# Patient Record
Sex: MALE | Race: WHITE | NOT HISPANIC OR LATINO | Employment: FULL TIME | URBAN - METROPOLITAN AREA
[De-identification: names, ages, dates, MRNs, and addresses within clinical notes are randomized per-mention and may not be internally consistent; named-entity substitution may affect disease eponyms.]

---

## 2023-08-04 ENCOUNTER — OFFICE VISIT (OUTPATIENT)
Dept: PRIMARY CARE | Facility: CLINIC | Age: 43
End: 2023-08-04
Payer: COMMERCIAL

## 2023-08-04 ENCOUNTER — LAB (OUTPATIENT)
Dept: LAB | Facility: LAB | Age: 43
End: 2023-08-04
Payer: COMMERCIAL

## 2023-08-04 VITALS
OXYGEN SATURATION: 98 % | WEIGHT: 212 LBS | BODY MASS INDEX: 30.35 KG/M2 | RESPIRATION RATE: 18 BRPM | SYSTOLIC BLOOD PRESSURE: 122 MMHG | HEIGHT: 70 IN | TEMPERATURE: 98 F | DIASTOLIC BLOOD PRESSURE: 76 MMHG | HEART RATE: 64 BPM

## 2023-08-04 DIAGNOSIS — Z00.00 HEALTH CARE MAINTENANCE: Primary | ICD-10-CM

## 2023-08-04 DIAGNOSIS — Z00.00 HEALTH CARE MAINTENANCE: ICD-10-CM

## 2023-08-04 PROBLEM — S83.411A TEAR OF MCL (MEDIAL COLLATERAL LIGAMENT) OF KNEE, RIGHT, INITIAL ENCOUNTER: Status: ACTIVE | Noted: 2023-08-04

## 2023-08-04 LAB
ALANINE AMINOTRANSFERASE (SGPT) (U/L) IN SER/PLAS: 15 U/L (ref 10–52)
ALBUMIN (G/DL) IN SER/PLAS: 5 G/DL (ref 3.4–5)
ALKALINE PHOSPHATASE (U/L) IN SER/PLAS: 74 U/L (ref 33–120)
ANION GAP IN SER/PLAS: 14 MMOL/L (ref 10–20)
ASPARTATE AMINOTRANSFERASE (SGOT) (U/L) IN SER/PLAS: 21 U/L (ref 9–39)
BASOPHILS (10*3/UL) IN BLOOD BY AUTOMATED COUNT: 0.03 X10E9/L (ref 0–0.1)
BASOPHILS/100 LEUKOCYTES IN BLOOD BY AUTOMATED COUNT: 0.6 % (ref 0–2)
BILIRUBIN TOTAL (MG/DL) IN SER/PLAS: 0.8 MG/DL (ref 0–1.2)
CALCIUM (MG/DL) IN SER/PLAS: 10 MG/DL (ref 8.6–10.3)
CARBON DIOXIDE, TOTAL (MMOL/L) IN SER/PLAS: 28 MMOL/L (ref 21–32)
CHLORIDE (MMOL/L) IN SER/PLAS: 101 MMOL/L (ref 98–107)
CHOLESTEROL (MG/DL) IN SER/PLAS: 191 MG/DL (ref 0–199)
CHOLESTEROL IN HDL (MG/DL) IN SER/PLAS: 52.9 MG/DL
CHOLESTEROL/HDL RATIO: 3.6
CREATININE (MG/DL) IN SER/PLAS: 1.05 MG/DL (ref 0.5–1.3)
EOSINOPHILS (10*3/UL) IN BLOOD BY AUTOMATED COUNT: 0.19 X10E9/L (ref 0–0.7)
EOSINOPHILS/100 LEUKOCYTES IN BLOOD BY AUTOMATED COUNT: 4 % (ref 0–6)
ERYTHROCYTE DISTRIBUTION WIDTH (RATIO) BY AUTOMATED COUNT: 12.5 % (ref 11.5–14.5)
ERYTHROCYTE MEAN CORPUSCULAR HEMOGLOBIN CONCENTRATION (G/DL) BY AUTOMATED: 34.1 G/DL (ref 32–36)
ERYTHROCYTE MEAN CORPUSCULAR VOLUME (FL) BY AUTOMATED COUNT: 90 FL (ref 80–100)
ERYTHROCYTES (10*6/UL) IN BLOOD BY AUTOMATED COUNT: 4.65 X10E12/L (ref 4.5–5.9)
GFR MALE: >90 ML/MIN/1.73M2
GLUCOSE (MG/DL) IN SER/PLAS: 82 MG/DL (ref 74–99)
HEMATOCRIT (%) IN BLOOD BY AUTOMATED COUNT: 41.9 % (ref 41–52)
HEMOGLOBIN (G/DL) IN BLOOD: 14.3 G/DL (ref 13.5–17.5)
IMMATURE GRANULOCYTES/100 LEUKOCYTES IN BLOOD BY AUTOMATED COUNT: 0.2 % (ref 0–0.9)
LDL: 121 MG/DL (ref 0–99)
LEUKOCYTES (10*3/UL) IN BLOOD BY AUTOMATED COUNT: 4.7 X10E9/L (ref 4.4–11.3)
LYMPHOCYTES (10*3/UL) IN BLOOD BY AUTOMATED COUNT: 2.23 X10E9/L (ref 1.2–4.8)
LYMPHOCYTES/100 LEUKOCYTES IN BLOOD BY AUTOMATED COUNT: 47.2 % (ref 13–44)
MONOCYTES (10*3/UL) IN BLOOD BY AUTOMATED COUNT: 0.34 X10E9/L (ref 0.1–1)
MONOCYTES/100 LEUKOCYTES IN BLOOD BY AUTOMATED COUNT: 7.2 % (ref 2–10)
NEUTROPHILS (10*3/UL) IN BLOOD BY AUTOMATED COUNT: 1.92 X10E9/L (ref 1.2–7.7)
NEUTROPHILS/100 LEUKOCYTES IN BLOOD BY AUTOMATED COUNT: 40.8 % (ref 40–80)
PLATELETS (10*3/UL) IN BLOOD AUTOMATED COUNT: 278 X10E9/L (ref 150–450)
POC APPEARANCE, URINE: CLEAR
POC BILIRUBIN, URINE: NEGATIVE
POC BLOOD, URINE: NEGATIVE
POC COLOR, URINE: YELLOW
POC GLUCOSE, URINE: NEGATIVE MG/DL
POC KETONES, URINE: NEGATIVE MG/DL
POC LEUKOCYTES, URINE: NEGATIVE
POC NITRITE,URINE: NEGATIVE
POC PH, URINE: 6.5 PH
POC PROTEIN, URINE: NEGATIVE MG/DL
POC SPECIFIC GRAVITY, URINE: 1.01
POC UROBILINOGEN, URINE: 0.2 EU/DL
POTASSIUM (MMOL/L) IN SER/PLAS: 4 MMOL/L (ref 3.5–5.3)
PROTEIN TOTAL: 7.8 G/DL (ref 6.4–8.2)
SODIUM (MMOL/L) IN SER/PLAS: 139 MMOL/L (ref 136–145)
TRIGLYCERIDE (MG/DL) IN SER/PLAS: 88 MG/DL (ref 0–149)
UREA NITROGEN (MG/DL) IN SER/PLAS: 17 MG/DL (ref 6–23)
VLDL: 18 MG/DL (ref 0–40)

## 2023-08-04 PROCEDURE — 36415 COLL VENOUS BLD VENIPUNCTURE: CPT

## 2023-08-04 PROCEDURE — 80053 COMPREHEN METABOLIC PANEL: CPT

## 2023-08-04 PROCEDURE — 83036 HEMOGLOBIN GLYCOSYLATED A1C: CPT

## 2023-08-04 PROCEDURE — 85025 COMPLETE CBC W/AUTO DIFF WBC: CPT

## 2023-08-04 PROCEDURE — 93000 ELECTROCARDIOGRAM COMPLETE: CPT | Performed by: FAMILY MEDICINE

## 2023-08-04 PROCEDURE — 1036F TOBACCO NON-USER: CPT | Performed by: FAMILY MEDICINE

## 2023-08-04 PROCEDURE — 99386 PREV VISIT NEW AGE 40-64: CPT | Performed by: FAMILY MEDICINE

## 2023-08-04 PROCEDURE — 81002 URINALYSIS NONAUTO W/O SCOPE: CPT | Performed by: FAMILY MEDICINE

## 2023-08-04 PROCEDURE — 80061 LIPID PANEL: CPT

## 2023-08-04 ASSESSMENT — PATIENT HEALTH QUESTIONNAIRE - PHQ9
SUM OF ALL RESPONSES TO PHQ9 QUESTIONS 1 AND 2: 0
1. LITTLE INTEREST OR PLEASURE IN DOING THINGS: NOT AT ALL
2. FEELING DOWN, DEPRESSED OR HOPELESS: NOT AT ALL

## 2023-08-04 ASSESSMENT — ENCOUNTER SYMPTOMS
SHORTNESS OF BREATH: 0
LOSS OF SENSATION IN FEET: 0
DEPRESSION: 0
HEADACHES: 0
OCCASIONAL FEELINGS OF UNSTEADINESS: 0

## 2023-08-04 NOTE — PROGRESS NOTES
"Subjective     Lorenzo Zhu is a 42 y.o. male who presents for Annual Exam.    HPI     Pt is new to me.  He is here to establish care and for well exam.      Pt's son has juvenile polyposis and patient had colonoscopy in 2017 which was normal, per report repeat in 5-10 years however patient prefers to wait for now.      Review of Systems   Respiratory:  Negative for shortness of breath.    Cardiovascular:  Negative for chest pain.   Neurological:  Negative for headaches.       Objective     Vitals:    08/04/23 1343   BP: 122/76   BP Location: Right arm   Patient Position: Sitting   Pulse: 64   Resp: 18   Temp: 36.7 °C (98 °F)   TempSrc: Temporal   SpO2: 98%   Weight: 96.2 kg (212 lb)   Height: 1.778 m (5' 10\")        No current outpatient medications     Past Surgical History:   Procedure Laterality Date    APPENDECTOMY      OTHER SURGICAL HISTORY  02/19/2019    Cyst excision        Social History     Tobacco Use    Smoking status: Never    Smokeless tobacco: Never   Vaping Use    Vaping Use: Never used   Substance Use Topics    Alcohol use: Yes     Alcohol/week: 7.0 - 10.0 standard drinks of alcohol     Types: 7 - 10 Standard drinks or equivalent per week        Family History   Problem Relation Name Age of Onset    Atrial fibrillation Mother      Breast cancer Mother      Other (acid reflux) Father      Other (juvenile polp) Son          Immunization History   Administered Date(s) Administered    PPD Test 04/08/2019    Pfizer Gray Cap SARS-CoV-2 06/04/2022    Pfizer Purple Cap SARS-CoV-2 03/20/2021, 04/14/2021        Physical Exam  Vitals reviewed.   Constitutional:       General: He is not in acute distress.     Appearance: Normal appearance.   HENT:      Head: Normocephalic and atraumatic.      Right Ear: Tympanic membrane, ear canal and external ear normal.      Left Ear: Tympanic membrane, ear canal and external ear normal.      Nose: Nose normal.      Mouth/Throat:      Mouth: Mucous membranes are moist. "      Pharynx: Oropharynx is clear. No oropharyngeal exudate or posterior oropharyngeal erythema.   Eyes:      Extraocular Movements: Extraocular movements intact.      Pupils: Pupils are equal, round, and reactive to light.   Neck:      Thyroid: No thyroid mass or thyromegaly.   Cardiovascular:      Rate and Rhythm: Normal rate and regular rhythm.      Heart sounds: No murmur heard.  Pulmonary:      Effort: Pulmonary effort is normal. No respiratory distress.      Breath sounds: Normal breath sounds. No wheezing, rhonchi or rales.   Abdominal:      General: Abdomen is flat. There is no distension.      Palpations: Abdomen is soft.      Tenderness: There is no abdominal tenderness.   Genitourinary:     Testes: Normal.   Musculoskeletal:         General: Normal range of motion.   Lymphadenopathy:      Cervical: No cervical adenopathy.   Skin:     General: Skin is warm and dry.      Findings: No rash.   Neurological:      General: No focal deficit present.      Mental Status: He is alert and oriented to person, place, and time. Mental status is at baseline.   Psychiatric:         Mood and Affect: Mood normal.         Behavior: Behavior normal.         Problem List Items Addressed This Visit    None  Visit Diagnoses       Health care maintenance    -  Primary    Relevant Orders    POCT UA (nonautomated w/o microscopy) manually resulted (Completed)    ECG 12 lead (Clinic Performed) (Completed)    Comprehensive Metabolic Panel    Lipid Panel    CBC and Auto Differential    Hemoglobin A1C            Assessment/Plan     Well Exam     Family hx of  juvenile polyposis in son - patient had colonoscopy in 2017 which was normal, per report repeat in 5-10 years however patient prefers to wait for now.      Vaccines - pt declined tdap    I recommend yearly flu vaccine and routine COVID vaccinations as indicated     Complete labs    BMI 30 - Healthy diet, routine exercise was discussed with patient      Follow up in 1 year or  sooner if needed

## 2023-08-05 LAB
ESTIMATED AVERAGE GLUCOSE FOR HBA1C: 97 MG/DL
HEMOGLOBIN A1C/HEMOGLOBIN TOTAL IN BLOOD: 5 %

## 2023-09-29 PROBLEM — N50.82 ACUTE PAIN IN SCROTUM: Status: ACTIVE | Noted: 2023-09-29

## 2023-09-29 PROBLEM — M25.561 RIGHT KNEE PAIN: Status: ACTIVE | Noted: 2023-09-29

## 2023-12-05 ENCOUNTER — APPOINTMENT (OUTPATIENT)
Dept: PRIMARY CARE | Facility: CLINIC | Age: 43
End: 2023-12-05
Payer: COMMERCIAL

## 2023-12-08 ENCOUNTER — OFFICE VISIT (OUTPATIENT)
Dept: PRIMARY CARE | Facility: CLINIC | Age: 43
End: 2023-12-08
Payer: COMMERCIAL

## 2023-12-08 VITALS
OXYGEN SATURATION: 96 % | SYSTOLIC BLOOD PRESSURE: 118 MMHG | DIASTOLIC BLOOD PRESSURE: 74 MMHG | BODY MASS INDEX: 31.75 KG/M2 | HEART RATE: 81 BPM | RESPIRATION RATE: 18 BRPM | WEIGHT: 221.8 LBS | HEIGHT: 70 IN | TEMPERATURE: 98.2 F

## 2023-12-08 DIAGNOSIS — E66.09 CLASS 1 OBESITY DUE TO EXCESS CALORIES WITHOUT SERIOUS COMORBIDITY WITH BODY MASS INDEX (BMI) OF 31.0 TO 31.9 IN ADULT: Primary | ICD-10-CM

## 2023-12-08 PROCEDURE — 3008F BODY MASS INDEX DOCD: CPT | Performed by: FAMILY MEDICINE

## 2023-12-08 PROCEDURE — 1036F TOBACCO NON-USER: CPT | Performed by: FAMILY MEDICINE

## 2023-12-08 PROCEDURE — 99214 OFFICE O/P EST MOD 30 MIN: CPT | Performed by: FAMILY MEDICINE

## 2023-12-08 ASSESSMENT — ENCOUNTER SYMPTOMS
HEADACHES: 0
SHORTNESS OF BREATH: 0

## 2023-12-08 NOTE — PROGRESS NOTES
"Subjective     Lorenzo Zhu is a 43 y.o. male who presents for Weight Loss (Per patient the medication has a deductible for the medication and has discussed it with insurance. ).    HPI     Pt is here to discuss GLP1 treatment to help treat his obesity.  He tends to over-eat.  He travels for work and goes out to eat frequently.  He tries not to snack at night.  He has tried weight loss challenges through different gyms.  He has not seen a dietitian before.  He tries to intermittent fast as well. He is physically active.      Review of Systems   Respiratory:  Negative for shortness of breath.    Cardiovascular:  Negative for chest pain.   Neurological:  Negative for headaches.       Objective     Vitals:    12/08/23 1534   BP: 118/74   BP Location: Right arm   Patient Position: Sitting   Pulse: 81   Resp: 18   Temp: 36.8 °C (98.2 °F)   TempSrc: Temporal   SpO2: 96%   Weight: 101 kg (221 lb 12.8 oz)   Height: 1.778 m (5' 10\")        Current Outpatient Medications   Medication Instructions    tirzepatide (weight loss) (ZEPBOUND) 2.5 mg, subcutaneous, Every 7 days        Past Surgical History:   Procedure Laterality Date    APPENDECTOMY      OTHER SURGICAL HISTORY  02/19/2019    Cyst excision        Social History     Tobacco Use    Smoking status: Never    Smokeless tobacco: Never   Vaping Use    Vaping Use: Never used   Substance Use Topics    Alcohol use: Yes     Alcohol/week: 7.0 - 10.0 standard drinks of alcohol     Types: 7 - 10 Standard drinks or equivalent per week        Family History   Problem Relation Name Age of Onset    Atrial fibrillation Mother      Breast cancer Mother      Other (acid reflux) Father      Other (juvenile polp) Son          Immunization History   Administered Date(s) Administered    PPD Test 04/08/2019    Pfizer Gray Cap SARS-CoV-2 06/04/2022    Pfizer Purple Cap SARS-CoV-2 03/20/2021, 04/14/2021        Physical Exam  Vitals reviewed.   Constitutional:       General: He is not in " acute distress.     Appearance: Normal appearance. He is well-developed.   HENT:      Head: Normocephalic and atraumatic.   Eyes:      General: Lids are normal.      Conjunctiva/sclera:      Right eye: Right conjunctiva is not injected.      Left eye: Left conjunctiva is not injected.   Cardiovascular:      Rate and Rhythm: Normal rate and regular rhythm.      Heart sounds: No murmur heard.  Pulmonary:      Effort: Pulmonary effort is normal. No respiratory distress.      Breath sounds: Normal breath sounds. No wheezing, rhonchi or rales.   Skin:     General: Skin is warm and dry.      Findings: No rash.   Neurological:      Mental Status: He is alert and oriented to person, place, and time. Mental status is at baseline.   Psychiatric:         Mood and Affect: Mood normal.         Behavior: Behavior normal.         Problem List Items Addressed This Visit    None  Visit Diagnoses       Class 1 obesity due to excess calories without serious comorbidity with body mass index (BMI) of 31.0 to 31.9 in adult    -  Primary    Relevant Medications    tirzepatide, weight loss, (Zepbound) 2.5 mg/0.5 mL injection            Assessment/Plan     Obesity - BMI 31, will start GLP-1.  Patient is aware of the black box warning for GLP-1 medications.  Patient denies a personal or family hx of medullary thyroid carcinoma or a personal history of multiple endocrine neoplasia syndrome type 2 (MEN-2).   Pt is aware of the common side effects of GLP-1 medications including including nausea, vomiting, diarrhea, or constipation.        Follow up 1 month

## 2024-03-21 ENCOUNTER — TELEPHONE (OUTPATIENT)
Dept: PRIMARY CARE | Facility: CLINIC | Age: 44
End: 2024-03-21
Payer: COMMERCIAL

## 2024-03-22 DIAGNOSIS — E66.09 CLASS 1 OBESITY DUE TO EXCESS CALORIES WITHOUT SERIOUS COMORBIDITY WITH BODY MASS INDEX (BMI) OF 31.0 TO 31.9 IN ADULT: ICD-10-CM

## 2024-03-22 RX ORDER — TIRZEPATIDE 2.5 MG/.5ML
INJECTION, SOLUTION SUBCUTANEOUS
Qty: 2 ML | Refills: 0 | Status: SHIPPED | OUTPATIENT
Start: 2024-03-22 | End: 2024-04-02

## 2024-04-02 ENCOUNTER — OFFICE VISIT (OUTPATIENT)
Dept: PRIMARY CARE | Facility: CLINIC | Age: 44
End: 2024-04-02
Payer: COMMERCIAL

## 2024-04-02 VITALS
HEART RATE: 54 BPM | TEMPERATURE: 97.2 F | WEIGHT: 207 LBS | SYSTOLIC BLOOD PRESSURE: 117 MMHG | HEIGHT: 70 IN | RESPIRATION RATE: 18 BRPM | BODY MASS INDEX: 29.63 KG/M2 | DIASTOLIC BLOOD PRESSURE: 79 MMHG | OXYGEN SATURATION: 99 %

## 2024-04-02 DIAGNOSIS — E66.09 CLASS 1 OBESITY DUE TO EXCESS CALORIES WITHOUT SERIOUS COMORBIDITY WITH BODY MASS INDEX (BMI) OF 31.0 TO 31.9 IN ADULT: Primary | ICD-10-CM

## 2024-04-02 PROCEDURE — 99213 OFFICE O/P EST LOW 20 MIN: CPT | Performed by: FAMILY MEDICINE

## 2024-04-02 PROCEDURE — 3008F BODY MASS INDEX DOCD: CPT | Performed by: FAMILY MEDICINE

## 2024-04-02 PROCEDURE — 1036F TOBACCO NON-USER: CPT | Performed by: FAMILY MEDICINE

## 2024-04-02 ASSESSMENT — ENCOUNTER SYMPTOMS
VOMITING: 0
ABDOMINAL PAIN: 0
CONSTIPATION: 0
NAUSEA: 0

## 2024-04-02 NOTE — PROGRESS NOTES
"Subjective     Lorenzo Zhu is a 43 y.o. male who presents for Obesity.    HPI     Pt was started on zepbound 0.25 mg weekly in Dec. 2023 due to obesity.  He reports less food cravings.  He is eating better overall.  He weighed 221 lbs in Dec. 2023.  He currently weighs 207 lbs.  He ran out of the zepbound three weeks ago.  He is exercising more.  Currently his food cravings have increased since he has been off of the zepbound for a few weeks.  He wants to restart the zepbound and increase dose.  His alcohol consumption has decreased as well.  He denies nausea, vomiting, constipation, diarrhea.  He does have some occasional acid reflux while on zepbound but he reports it is minimal.      Review of Systems   Gastrointestinal:  Negative for abdominal pain, constipation, nausea and vomiting.       Objective     Vitals:    04/02/24 1051   BP: 117/79   BP Location: Left arm   Patient Position: Sitting   Pulse: 54   Resp: 18   Temp: 36.2 °C (97.2 °F)   TempSrc: Temporal   SpO2: 99%   Weight: 93.9 kg (207 lb)   Height: 1.778 m (5' 10\")        Current Outpatient Medications   Medication Instructions    tirzepatide (weight loss) (ZEPBOUND) 5 mg, subcutaneous, Every 7 days        No Known Allergies     Past Surgical History:   Procedure Laterality Date    APPENDECTOMY      OTHER SURGICAL HISTORY  02/19/2019    Cyst excision        Social History     Tobacco Use    Smoking status: Never    Smokeless tobacco: Never   Vaping Use    Vaping Use: Never used   Substance Use Topics    Alcohol use: Yes     Alcohol/week: 7.0 - 10.0 standard drinks of alcohol     Types: 7 - 10 Standard drinks or equivalent per week        Social History     Substance and Sexual Activity   Alcohol Use Yes    Alcohol/week: 7.0 - 10.0 standard drinks of alcohol    Types: 7 - 10 Standard drinks or equivalent per week       Family History   Problem Relation Name Age of Onset    Atrial fibrillation Mother      Breast cancer Mother      Other (acid reflux) " Father      Other (juvenile polp) Son          Immunization History   Administered Date(s) Administered    PPD Test 04/08/2019    Pfizer Gray Cap SARS-CoV-2 06/04/2022    Pfizer Purple Cap SARS-CoV-2 03/20/2021, 04/14/2021        Physical Exam  Vitals reviewed.   Constitutional:       General: He is not in acute distress.     Appearance: Normal appearance. He is well-developed.   HENT:      Head: Normocephalic and atraumatic.   Eyes:      General: Lids are normal.      Conjunctiva/sclera:      Right eye: Right conjunctiva is not injected.      Left eye: Left conjunctiva is not injected.   Cardiovascular:      Rate and Rhythm: Normal rate and regular rhythm.      Heart sounds: No murmur heard.  Pulmonary:      Effort: Pulmonary effort is normal. No respiratory distress.      Breath sounds: Normal breath sounds. No wheezing, rhonchi or rales.   Abdominal:      General: Abdomen is flat. There is no distension.      Palpations: Abdomen is soft. There is no mass.      Tenderness: There is no abdominal tenderness.   Skin:     General: Skin is warm and dry.      Findings: No rash.   Neurological:      Mental Status: He is alert and oriented to person, place, and time. Mental status is at baseline.   Psychiatric:         Mood and Affect: Mood normal.         Behavior: Behavior normal.         Problem List Items Addressed This Visit    None  Visit Diagnoses       Class 1 obesity due to excess calories without serious comorbidity with body mass index (BMI) of 31.0 to 31.9 in adult    -  Primary    Relevant Medications    tirzepatide, weight loss, (Zepbound) 5 mg/0.5 mL injection            Assessment/Plan     Hx of obesity (BMI 31 in Dec 2023) - started zepbound 2.5 mg weekly in Dec. 2023 which helped with weight loss, decreased food cravings.  Pt ran out of zepbound three weeks ago, he wants to restart zepbound but increase to 5 mg weekly.  Zepbound 5 mg prescribed.      Follow up 1 month

## 2024-04-26 DIAGNOSIS — E66.09 CLASS 1 OBESITY DUE TO EXCESS CALORIES WITHOUT SERIOUS COMORBIDITY WITH BODY MASS INDEX (BMI) OF 31.0 TO 31.9 IN ADULT: ICD-10-CM

## 2024-04-26 DIAGNOSIS — E66.09 CLASS 1 OBESITY DUE TO EXCESS CALORIES WITHOUT SERIOUS COMORBIDITY WITH BODY MASS INDEX (BMI) OF 31.0 TO 31.9 IN ADULT: Primary | ICD-10-CM

## 2024-05-24 DIAGNOSIS — E66.09 CLASS 1 OBESITY DUE TO EXCESS CALORIES WITHOUT SERIOUS COMORBIDITY WITH BODY MASS INDEX (BMI) OF 31.0 TO 31.9 IN ADULT: ICD-10-CM

## 2024-05-26 RX ORDER — TIRZEPATIDE 2.5 MG/.5ML
2.5 INJECTION, SOLUTION SUBCUTANEOUS
Qty: 2 ML | Refills: 0 | Status: SHIPPED | OUTPATIENT
Start: 2024-05-26

## 2024-06-11 ENCOUNTER — OFFICE VISIT (OUTPATIENT)
Dept: PRIMARY CARE | Facility: CLINIC | Age: 44
End: 2024-06-11
Payer: COMMERCIAL

## 2024-06-11 VITALS
SYSTOLIC BLOOD PRESSURE: 118 MMHG | DIASTOLIC BLOOD PRESSURE: 83 MMHG | HEIGHT: 70 IN | HEART RATE: 64 BPM | RESPIRATION RATE: 18 BRPM | WEIGHT: 204 LBS | BODY MASS INDEX: 29.2 KG/M2 | OXYGEN SATURATION: 98 % | TEMPERATURE: 98 F

## 2024-06-11 DIAGNOSIS — M54.2 NECK PAIN ON RIGHT SIDE: ICD-10-CM

## 2024-06-11 DIAGNOSIS — M25.561 ACUTE PAIN OF RIGHT KNEE: Primary | ICD-10-CM

## 2024-06-11 DIAGNOSIS — M79.672 CHRONIC HEEL PAIN, LEFT: ICD-10-CM

## 2024-06-11 DIAGNOSIS — G89.29 CHRONIC HEEL PAIN, LEFT: ICD-10-CM

## 2024-06-11 DIAGNOSIS — Z86.39 HISTORY OF OBESITY: ICD-10-CM

## 2024-06-11 DIAGNOSIS — B35.4 TINEA CORPORIS: ICD-10-CM

## 2024-06-11 PROCEDURE — 1036F TOBACCO NON-USER: CPT | Performed by: FAMILY MEDICINE

## 2024-06-11 PROCEDURE — 99214 OFFICE O/P EST MOD 30 MIN: CPT | Performed by: FAMILY MEDICINE

## 2024-06-11 PROCEDURE — 3008F BODY MASS INDEX DOCD: CPT | Performed by: FAMILY MEDICINE

## 2024-06-11 RX ORDER — KETOCONAZOLE 20 MG/G
CREAM TOPICAL
Qty: 30 G | Refills: 0 | Status: SHIPPED | OUTPATIENT
Start: 2024-06-11

## 2024-06-11 NOTE — PROGRESS NOTES
"Subjective     Lorenzo Zhu is a 43 y.o. male who presents for Knee Injury, Back Pain, and Knee Pain.    HPI     Pt takes zepbound 2.5 mg weekly (he was prescribed zepbound 5 mg a few months ago but it has not been available , he has never taken the 5 mg dose).  He denies side effects to zepbound.     He also has right knee pain which started two months ago after a hockey injury.  He was hit on his left side of body and he reports his right knee bend inward and he has pain on right lateral knee.  He is able to ambulate well but does get some pain with full right knee extension.  He denies catching or locking of knee.  He denies knee swelling.     He also reports left heel pain which started approximately 10 months ago.  No known injury.  The heel pain is located on plantar aspect of foot.  He denies burning foot pain.  He does note some tenderness over heel with standing.  He denies hx of plantar fascitis or heel spurs.      He also has did some upper body exercises (back squats) about six weeks ago, he is concerned for possible cervical disc herniation.  He denies radicular pain.  He does notice right trap muscle pain.  He does not take antiinflammatories for his knee or neck pain.    He also reports circular raised salmon colored skin lesions on middle chest , left upper chest - present for a few years.  Pt has never had the rash treated before.         Objective     Vitals:    06/11/24 1525   BP: 118/83   BP Location: Left arm   Patient Position: Sitting   Pulse: 64   Resp: 18   Temp: 36.7 °C (98 °F)   TempSrc: Temporal   SpO2: 98%   Weight: 92.5 kg (204 lb)   Height: 1.778 m (5' 10\")        Current Outpatient Medications   Medication Instructions    ketoconazole (NIZOral) 2 % cream Apply sparingly to affected area twice daily    tirzepatide (weight loss) (ZEPBOUND) 5 mg, subcutaneous, Every 7 days    Zepbound 2.5 mg, subcutaneous, Once Weekly        No Known Allergies     Past Surgical History:   Procedure " Laterality Date    APPENDECTOMY      OTHER SURGICAL HISTORY  02/19/2019    Cyst excision        Social History     Tobacco Use    Smoking status: Never    Smokeless tobacco: Never   Vaping Use    Vaping status: Never Used   Substance Use Topics    Alcohol use: Yes     Alcohol/week: 7.0 - 10.0 standard drinks of alcohol     Types: 7 - 10 Standard drinks or equivalent per week        Social History     Substance and Sexual Activity   Alcohol Use Yes    Alcohol/week: 7.0 - 10.0 standard drinks of alcohol    Types: 7 - 10 Standard drinks or equivalent per week       Family History   Problem Relation Name Age of Onset    Atrial fibrillation Mother      Breast cancer Mother      Other (acid reflux) Father      Other (juvenile polp) Son          Immunization History   Administered Date(s) Administered    PPD Test 04/08/2019    Pfizer Gray Cap SARS-CoV-2 06/04/2022    Pfizer Purple Cap SARS-CoV-2 03/20/2021, 04/14/2021        Physical Exam  Vitals reviewed.   Constitutional:       General: He is not in acute distress.     Appearance: Normal appearance. He is well-developed.   HENT:      Head: Normocephalic and atraumatic.   Eyes:      General: Lids are normal.      Conjunctiva/sclera:      Right eye: Right conjunctiva is not injected.      Left eye: Left conjunctiva is not injected.   Cardiovascular:      Rate and Rhythm: Normal rate and regular rhythm.      Heart sounds: No murmur heard.  Pulmonary:      Effort: Pulmonary effort is normal. No respiratory distress.      Breath sounds: Normal breath sounds. No wheezing, rhonchi or rales.   Musculoskeletal:      Cervical back: Bony tenderness (mild ttp over C7.) present. No swelling, deformity, spasms, tenderness or crepitus. No pain with movement. Normal range of motion.      Thoracic back: Normal.      Right knee: No crepitus. Normal range of motion. No tenderness. No LCL laxity or MCL laxity.      Left knee: Normal range of motion. No tenderness.      Left foot: Normal  range of motion. No swelling, tenderness or crepitus.      Comments: No left heel ttp     Pes planus noted    Skin:     General: Skin is warm and dry.      Findings: No rash.   Neurological:      Mental Status: He is alert and oriented to person, place, and time. Mental status is at baseline.   Psychiatric:         Mood and Affect: Mood normal.         Behavior: Behavior normal.         Problem List Items Addressed This Visit       Right knee pain - Primary    Relevant Orders    Referral to Physical Therapy    Referral to Sports Medicine    History of obesity     Other Visit Diagnoses       Chronic heel pain, left        Tinea corporis        Relevant Medications    ketoconazole (NIZOral) 2 % cream    Neck pain on right side                Assessment/Plan     Knee pain - right - possible strain vs meniscus tear - will have patient see physical therapy, sports medicine.  Discussed ice, NSAIDs as needed    Heel pain, chronic, left - possible plantar fasciitis vs heel spur vs stress fracture - improving per patient.  Pt declined podiatry referral at this time.  He will try ice, orthotics, calf stretches.  If symptoms persist follow up in office    Neck pain, right side - trap muscle.  C7 ttp, possible bone contusion - improving, if symptoms persist consider c spine xray.  Pt agreeable.     Chronic chest rash - possible tinea - prescribed ketoconazole cream, if symptoms persist consider ketoconazole shampoo, derm referral.  Pt agreeable.      Hx of obesity - on zepbound 2.5 mg weekly (unable to obtain 5 mg dose - not in stock) cravings increasing.      Follow up 1 month

## 2024-06-22 DIAGNOSIS — E66.09 CLASS 1 OBESITY DUE TO EXCESS CALORIES WITHOUT SERIOUS COMORBIDITY WITH BODY MASS INDEX (BMI) OF 31.0 TO 31.9 IN ADULT: ICD-10-CM

## 2024-06-24 RX ORDER — TIRZEPATIDE 2.5 MG/.5ML
2.5 INJECTION, SOLUTION SUBCUTANEOUS
Qty: 2 ML | Refills: 2 | Status: SHIPPED | OUTPATIENT
Start: 2024-06-24

## 2024-07-11 ENCOUNTER — TELEPHONE (OUTPATIENT)
Dept: PRIMARY CARE | Facility: CLINIC | Age: 44
End: 2024-07-11
Payer: COMMERCIAL

## 2024-07-11 DIAGNOSIS — E66.09 CLASS 1 OBESITY DUE TO EXCESS CALORIES WITHOUT SERIOUS COMORBIDITY WITH BODY MASS INDEX (BMI) OF 31.0 TO 31.9 IN ADULT: ICD-10-CM

## 2024-07-11 DIAGNOSIS — Z86.39 HISTORY OF OBESITY: Primary | ICD-10-CM

## 2024-07-11 RX ORDER — TIRZEPATIDE 2.5 MG/.5ML
2.5 INJECTION, SOLUTION SUBCUTANEOUS
Qty: 2 ML | Refills: 2 | OUTPATIENT
Start: 2024-07-11

## 2024-07-11 NOTE — TELEPHONE ENCOUNTER
"Hello,     My zepbound prescription has . As we discussed, I would like to increase the dose to 5 mg from 2.5 mg. Can you resubmit this script so that I can get \"in line\" to get it filled?     Thank you very much,     Fareed"

## 2024-07-15 ENCOUNTER — HOSPITAL ENCOUNTER (OUTPATIENT)
Dept: RADIOLOGY | Facility: CLINIC | Age: 44
Discharge: HOME | End: 2024-07-15
Payer: COMMERCIAL

## 2024-07-15 ENCOUNTER — OFFICE VISIT (OUTPATIENT)
Dept: ORTHOPEDIC SURGERY | Facility: CLINIC | Age: 44
End: 2024-07-15
Payer: COMMERCIAL

## 2024-07-15 VITALS — HEIGHT: 70 IN | WEIGHT: 205 LBS | BODY MASS INDEX: 29.35 KG/M2

## 2024-07-15 DIAGNOSIS — S83.511A DISRUPTION OF ANTERIOR CRUCIATE LIGAMENT OF KNEE, RIGHT, INITIAL ENCOUNTER: Primary | ICD-10-CM

## 2024-07-15 DIAGNOSIS — M25.561 ACUTE PAIN OF RIGHT KNEE: ICD-10-CM

## 2024-07-15 PROCEDURE — 73564 X-RAY EXAM KNEE 4 OR MORE: CPT | Mod: RIGHT SIDE | Performed by: RADIOLOGY

## 2024-07-15 PROCEDURE — 99213 OFFICE O/P EST LOW 20 MIN: CPT | Performed by: PHYSICIAN ASSISTANT

## 2024-07-15 PROCEDURE — 99203 OFFICE O/P NEW LOW 30 MIN: CPT | Performed by: PHYSICIAN ASSISTANT

## 2024-07-15 PROCEDURE — 1036F TOBACCO NON-USER: CPT | Performed by: PHYSICIAN ASSISTANT

## 2024-07-15 PROCEDURE — 3008F BODY MASS INDEX DOCD: CPT | Performed by: PHYSICIAN ASSISTANT

## 2024-07-15 PROCEDURE — 73564 X-RAY EXAM KNEE 4 OR MORE: CPT | Mod: RT

## 2024-07-15 ASSESSMENT — PAIN SCALES - GENERAL: PAINLEVEL_OUTOF10: 1

## 2024-07-15 ASSESSMENT — PAIN - FUNCTIONAL ASSESSMENT: PAIN_FUNCTIONAL_ASSESSMENT: 0-10

## 2024-07-15 NOTE — PROGRESS NOTES
"Subjective    Patient ID: Lorenzo Zhu is a 43 y.o. male.    Chief Complaint: Pain of the Right Knee (Hockey injury pain for a few months)           HPI:  Lorenzo Zhu is a 43 y.o. male who presents today for evaluation of his right knee.  About 3 months ago while playing ice hockey someone collided into him causing what he describes as a valgus type injury to his right knee.  He recalls at the time of injury feeling and hearing a \"pop\".  He feels that the pop occurred deep in his knee.  He noted quite a bit of swelling after the injury.  He stated it took about 3 to 4 weeks for the swelling to really improved.  He since has been able to get back to some light activity.  He attempted to return back to ice hockey a little over a week ago and when making a crossover move felt his right knee buckle and give out.  He states that he had 1 similar episode to this before then when he was playing [place]bee on the beach and he had a similar instance of buckling but not as severe.  He currently is asymptomatic in his left knee.    ROS  Constitutional: No fever, no chills, not feeling tired, no recent weight gain and no recent weight loss  ENT: No nosebleeds  Cardiovascular: No chest pain  Respiratory: No shortness of breath and no cough  Gastrointestinal: No abdominal pain, no nausea, no diarrhea, and no vomiting  Musculoskeletal: No arthralgias  Integumentary: No rashes and no skin lesions  Neurological: No headache  Psychiatric: No sleep disturbances no depression  Endocrine: No muscle weakness and no muscle cramps  Hematologic/lymphatic: No swelling glands and no tendency for easy bruising    Past Medical History:   Diagnosis Date    Encounter for other general counseling and advice on contraception 02/19/2019    Vasectomy evaluation    Other specified health status     No pertinent past medical history    Personal history of other specified conditions 09/21/2017    History of nausea        Past Surgical History: "   Procedure Laterality Date    APPENDECTOMY      OTHER SURGICAL HISTORY  02/19/2019    Cyst excision          Current Outpatient Medications:     ketoconazole (NIZOral) 2 % cream, Apply sparingly to affected area twice daily, Disp: 30 g, Rfl: 0    tirzepatide, weight loss, (Zepbound) 5 mg/0.5 mL injection, Inject 5 mg under the skin every 7 days., Disp: 4 each, Rfl: 2     No Known Allergies     Social Connections: Not on file          Objective   43-year-old male in no acute distress. Well nourished. Normal affect. Alert and oriented x 3.   Gait: Normal Tandem. Neutral alignment. Able to perform single leg stance. No abnormalities of balance or coordination.  Skin: Intact over the bilateral upper and lower extremities. No erythema, ecchymosis, or temperature changes.  Negative bilateral popliteal lymphadenopathy.  Right Knee:  ROM: 0-130 degrees. Negative crepitus.  Trace effusion.  Good quadriceps contraction. Intact extensor mechanism. Patellar tendon non-tender.  Patella facets non-tender. Negative apprehension. Negative tilt.   Lachman positive. Anterior drawer positive. Posterior drawer negative.  Stable medial collateral ligament. Stable lateral collateral ligament.   Negative medial joint line tenderness.  Negative Wing's.  Negative lateral joint line tenderness.  Negative Wing's.     Left Knee:  ROM: 0-130 degrees. Negative crepitus.  No effusion.  Good quadriceps contraction. Intact extensor mechanism. Patellar tendon non-tender.  Patella facets non-tender. Negative apprehension. Negative tilt.   Lachman stable. Anterior drawer stable. Posterior drawer negative.  Stable medial collateral ligament. Stable lateral collateral ligament.   Negative medial joint line tenderness.  Negative Wing's.  Negative lateral joint line tenderness.  Negative Wing's.     Motor Strength: 5 out of 5 in the bilateral lower extremities.  Vascular: 2+ DP/PT pulses bilaterally. Bilateral lower extremity compartments  supple.      Image Results:  X-rays of the right knee taken today in the office reviewed.  These negative for fracture or dislocation.  Joint space fairly well-maintained.      Assessment/Plan   Encounter Diagnoses:  Disruption of anterior cruciate ligament of knee, right, initial encounter    Acute pain of right knee    Orders Placed This Encounter    XR knee right 4+ views    MR knee right wo IV contrast       I am concerned about an injury to his ACL.  We are going to proceed with an MRI to evaluate this further.  The MRI is medically indicated and necessary given his subjective complaints including instability and physical exam findings such as a positive Lachman's and anterior drawer.  The MRI should be performed in a hospital setting so the surgeon has access to the images.  The MRI will be utilized for potential presurgical planning purposes.  In the meantime he may continue light activity but avoid anything where he is trying to twist or pivot.  He can ice and use over-the-counter medicines for any achiness or soreness.  Following completion the MRI we will review the results with him by phone and develop a detailed treatment plan.    This office note was dictated using Dragon voice to text software and was not proofread for spelling or grammatical errors

## 2024-07-24 ENCOUNTER — HOSPITAL ENCOUNTER (OUTPATIENT)
Dept: RADIOLOGY | Facility: CLINIC | Age: 44
Discharge: HOME | End: 2024-07-24
Payer: COMMERCIAL

## 2024-07-24 DIAGNOSIS — S83.511A DISRUPTION OF ANTERIOR CRUCIATE LIGAMENT OF KNEE, RIGHT, INITIAL ENCOUNTER: ICD-10-CM

## 2024-07-24 PROCEDURE — 73721 MRI JNT OF LWR EXTRE W/O DYE: CPT | Mod: RT

## 2024-07-24 PROCEDURE — 73721 MRI JNT OF LWR EXTRE W/O DYE: CPT | Mod: RIGHT SIDE | Performed by: RADIOLOGY

## 2024-07-25 ENCOUNTER — TELEPHONE (OUTPATIENT)
Dept: ORTHOPEDIC SURGERY | Facility: HOSPITAL | Age: 44
End: 2024-07-25
Payer: COMMERCIAL

## 2024-07-25 DIAGNOSIS — S83.511A DISRUPTION OF ANTERIOR CRUCIATE LIGAMENT OF KNEE, RIGHT, INITIAL ENCOUNTER: Primary | ICD-10-CM

## 2024-07-25 NOTE — TELEPHONE ENCOUNTER
Pt called in for Bee to received MRI results. I sent message to ignacio SMITH and was told patient was urgent and Bee could call him tomorrow. I spoke to patient and let him know that Bee is out today and tomorrow and would return Monday. Patient is is very anxious, he knows he needs surgery and is very anxious to get the ball rolling to due future events taking place in his personal life. I also advised him that Dr. Ruiz would be out over the next couple weeks and that he would need to see him next for a surgical consult. After speaking with patient he seemed to be ok with waiting for Bee to call him with MRI results next week.

## 2024-07-29 ENCOUNTER — DOCUMENTATION (OUTPATIENT)
Dept: ORTHOPEDIC SURGERY | Facility: HOSPITAL | Age: 44
End: 2024-07-29
Payer: COMMERCIAL

## 2024-07-29 ENCOUNTER — TELEPHONE (OUTPATIENT)
Dept: ORTHOPEDIC SURGERY | Facility: CLINIC | Age: 44
End: 2024-07-29
Payer: COMMERCIAL

## 2024-07-29 NOTE — TELEPHONE ENCOUNTER
----- Message from Antwan Torres sent at 7/29/2024  8:34 AM EDT -----  Regarding: appt with Dr Ruiz  Can you reach out to him? He would like to see Dr Ruiz prior to surgery    Thanks  Remi  ----- Message -----  From: Interface, Radiology Results In  Sent: 7/24/2024   4:43 PM EDT  To: Antwan Torres PA-C

## 2024-07-29 NOTE — PROGRESS NOTES
I spoke to Fareed regarding the MRI on his right knee.  Unfortunately it confirms the presence of an ACL tear.    He stated that he plans on continue to be active and participating in sports.  He would like to proceed with right knee ACL reconstruction with patella tendon autograft.  We briefly discussed surgery in the initial postoperative course and logistics in regards to work.  He would like to meet with Dr. Ruiz prior to surgical intervention.  This will be arranged for him.

## 2024-07-31 ENCOUNTER — PREP FOR PROCEDURE (OUTPATIENT)
Dept: ORTHOPEDIC SURGERY | Facility: HOSPITAL | Age: 44
End: 2024-07-31
Payer: COMMERCIAL

## 2024-07-31 DIAGNOSIS — S83.511A DISRUPTION OF ANTERIOR CRUCIATE LIGAMENT OF KNEE, RIGHT, INITIAL ENCOUNTER: ICD-10-CM

## 2024-07-31 DIAGNOSIS — S83.511D DISRUPTION OF ANTERIOR CRUCIATE LIGAMENT OF RIGHT KNEE, SUBSEQUENT ENCOUNTER: Primary | ICD-10-CM

## 2024-08-06 ENCOUNTER — CLINICAL SUPPORT (OUTPATIENT)
Dept: PREADMISSION TESTING | Facility: HOSPITAL | Age: 44
End: 2024-08-06
Payer: COMMERCIAL

## 2024-08-06 DIAGNOSIS — S83.511D DISRUPTION OF ANTERIOR CRUCIATE LIGAMENT OF RIGHT KNEE, SUBSEQUENT ENCOUNTER: ICD-10-CM

## 2024-08-06 RX ORDER — BISMUTH SUBSALICYLATE 262 MG
1 TABLET,CHEWABLE ORAL DAILY
COMMUNITY

## 2024-08-06 NOTE — PERIOPERATIVE NURSING NOTE
PAT nurse screening call completed; chart updated per information provided by patient. Education/ instructions reviewed; patient denied further questions or concerns. Patient aware of upcoming OR date on 8-20-24 and understands that someone will call him the day before to confirm his arrival time for day of surgery.

## 2024-08-19 ENCOUNTER — OFFICE VISIT (OUTPATIENT)
Dept: ORTHOPEDIC SURGERY | Facility: CLINIC | Age: 44
End: 2024-08-19
Payer: COMMERCIAL

## 2024-08-19 DIAGNOSIS — S83.511A DISRUPTION OF ANTERIOR CRUCIATE LIGAMENT OF KNEE, RIGHT, INITIAL ENCOUNTER: Primary | ICD-10-CM

## 2024-08-19 PROCEDURE — 1036F TOBACCO NON-USER: CPT | Performed by: ORTHOPAEDIC SURGERY

## 2024-08-19 PROCEDURE — 99213 OFFICE O/P EST LOW 20 MIN: CPT | Performed by: ORTHOPAEDIC SURGERY

## 2024-08-19 NOTE — PROGRESS NOTES
PRIMARY CARE PHYSICIAN: Karlos Woodson,   ORTHOPAEDIC FOLLOW-UP: Knee Evaluation      SUBJECTIVE  CHIEF COMPLAINT:  Right knee injury    HPI: Lorenzo Zhu is a 43 y.o. male presenting with his wife for presurgical discussion of a right knee ACL injury.  This was from playing hockey in April.  He has attempted conservative measures but unfortunately his knee remains quite unstable.  His surgery is planned for tomorrow.  He desires to remain active with hockey and exercise.  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    REVIEW OF SYSTEMS  Constitutional: See HPI for pain assessment, No significant weight loss, recent trauma  Cardiovascular: No chest pain, shortness of breath  Respiratory: No difficulty breathing, cough  Gastrointestinal: No nausea, vomiting, diarrhea, constipation  Musculoskeletal: Noted in HPI, positive for pain, restricted motion, stiffness  Integumentary: No rashes, easy bruising, redness   Neurological: no numbness or tingling in extremities, no gait disturbances   Psychiatric: No mood changes, memory changes, social issues  Heme/Lymph: no excessive swelling, easy bruising, excessive bleeding  ENT: No hearing changes  Eyes: No vision changes    Past Medical History:   Diagnosis Date    Encounter for other general counseling and advice on contraception 02/19/2019    Vasectomy evaluation    Other specified health status     No pertinent past medical history    Personal history of other specified conditions 09/21/2017    History of nausea        No Known Allergies     Past Surgical History:   Procedure Laterality Date    APPENDECTOMY      OTHER SURGICAL HISTORY  02/19/2019    Cyst excision    VASECTOMY  2021        Family History   Problem Relation Name Age of Onset    Atrial fibrillation Mother      Breast cancer Mother      Other (acid reflux) Father      Other (juvenile polyp) Son          Social History     Socioeconomic History    Marital status:      Spouse name: Eli  "   Number of children: 2    Years of education: Not on file    Highest education level: Not on file   Occupational History    Occupation: [employer]   Tobacco Use    Smoking status: Never     Passive exposure: Never    Smokeless tobacco: Never   Vaping Use    Vaping status: Never Used   Substance and Sexual Activity    Alcohol use: Yes     Alcohol/week: 7.0 - 10.0 standard drinks of alcohol     Types: 7 - 10 Standard drinks or equivalent per week    Drug use: Never    Sexual activity: Yes   Other Topics Concern    Not on file   Social History Narrative    Not on file     Social Determinants of Health     Financial Resource Strain: Not on file   Food Insecurity: Not on file   Transportation Needs: Not on file   Physical Activity: Not on file   Stress: Not on file   Social Connections: Not on file   Intimate Partner Violence: Not on file   Housing Stability: Not on file        CURRENT MEDICATIONS:   Current Outpatient Medications   Medication Sig Dispense Refill    ketoconazole (NIZOral) 2 % cream Apply sparingly to affected area twice daily (Patient not taking: Reported on 8/6/2024) 30 g 0    multivitamin tablet Take 1 tablet by mouth once daily.      tirzepatide, weight loss, (Zepbound) 5 mg/0.5 mL injection Inject 5 mg under the skin every 7 days. 4 each 2     No current facility-administered medications for this visit.        OBJECTIVE    PHYSICAL EXAM      8/4/2023     1:43 PM 12/8/2023     3:34 PM 4/2/2024    10:51 AM 6/11/2024     3:25 PM 7/15/2024     1:34 PM   Vitals   Systolic 122 118 117 118    Diastolic 76 74 79 83    Heart Rate 64 81 54 64    Temp 36.7 °C (98 °F) 36.8 °C (98.2 °F) 36.2 °C (97.2 °F) 36.7 °C (98 °F)    Resp 18 18 18 18    Height (in) 1.778 m (5' 10\") 1.778 m (5' 10\") 1.778 m (5' 10\") 1.778 m (5' 10\") 1.778 m (5' 10\")   Weight (lb) 212 221.8 207 204 205   BMI 30.42 kg/m2 31.82 kg/m2 29.7 kg/m2 29.27 kg/m2 29.41 kg/m2   BSA (m2) 2.18 m2 2.23 m2 2.15 m2 2.14 m2 2.14 m2   Visit Report " Report Report Report Report Report      A focused exam was performed today.  His skin is intact.  He achieves full extension and 130 degrees of flexion.  Good quadricep contraction and trace effusion.  Positive Lachman.  Negative joint line tenderness and stable collateral ligaments.  Bilateral lower extremity compartments supple.  5 out of 5 distal motor strength bilaterally.  L4-S1 sensation intact bilaterally.  2+ DP/PT pulses bilaterally.    ASSESSMENT & PLAN    Impression: 43 y.o. male with right knee ACL injury.    Plan:   The patient, his wife and I reviewed his surgical plan for outpatient right knee arthroscopic ACL reconstruction with patellar tendon autograft.  We discussed the timing on crutches and postoperative rehabilitation.  We again reviewed the risks of the procedure including bleeding, infection, venous thromboembolism, persistent pain and recurrent knee instability.    At the end of the visit, all questions were answered in full. The patient is in agreement with the plan and recommendations. They will call the office with any questions/concerns.    Note dictated with [employer] software. Completed without full typed error editing and sent to avoid delay.

## 2024-08-19 NOTE — H&P (VIEW-ONLY)
PRIMARY CARE PHYSICIAN: Karlos Woodson,   ORTHOPAEDIC FOLLOW-UP: Knee Evaluation      SUBJECTIVE  CHIEF COMPLAINT:  Right knee injury    HPI: Lorenzo Zhu is a 43 y.o. male presenting with his wife for presurgical discussion of a right knee ACL injury.  This was from playing hockey in April.  He has attempted conservative measures but unfortunately his knee remains quite unstable.  His surgery is planned for tomorrow.  He desires to remain active with hockey and exercise.  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    REVIEW OF SYSTEMS  Constitutional: See HPI for pain assessment, No significant weight loss, recent trauma  Cardiovascular: No chest pain, shortness of breath  Respiratory: No difficulty breathing, cough  Gastrointestinal: No nausea, vomiting, diarrhea, constipation  Musculoskeletal: Noted in HPI, positive for pain, restricted motion, stiffness  Integumentary: No rashes, easy bruising, redness   Neurological: no numbness or tingling in extremities, no gait disturbances   Psychiatric: No mood changes, memory changes, social issues  Heme/Lymph: no excessive swelling, easy bruising, excessive bleeding  ENT: No hearing changes  Eyes: No vision changes    Past Medical History:   Diagnosis Date    Encounter for other general counseling and advice on contraception 02/19/2019    Vasectomy evaluation    Other specified health status     No pertinent past medical history    Personal history of other specified conditions 09/21/2017    History of nausea        No Known Allergies     Past Surgical History:   Procedure Laterality Date    APPENDECTOMY      OTHER SURGICAL HISTORY  02/19/2019    Cyst excision    VASECTOMY  2021        Family History   Problem Relation Name Age of Onset    Atrial fibrillation Mother      Breast cancer Mother      Other (acid reflux) Father      Other (juvenile polyp) Son          Social History     Socioeconomic History    Marital status:      Spouse name: Eli  "   Number of children: 2    Years of education: Not on file    Highest education level: Not on file   Occupational History    Occupation: bi[employer] company   Tobacco Use    Smoking status: Never     Passive exposure: Never    Smokeless tobacco: Never   Vaping Use    Vaping status: Never Used   Substance and Sexual Activity    Alcohol use: Yes     Alcohol/week: 7.0 - 10.0 standard drinks of alcohol     Types: 7 - 10 Standard drinks or equivalent per week    Drug use: Never    Sexual activity: Yes   Other Topics Concern    Not on file   Social History Narrative    Not on file     Social Determinants of Health     Financial Resource Strain: Not on file   Food Insecurity: Not on file   Transportation Needs: Not on file   Physical Activity: Not on file   Stress: Not on file   Social Connections: Not on file   Intimate Partner Violence: Not on file   Housing Stability: Not on file        CURRENT MEDICATIONS:   Current Outpatient Medications   Medication Sig Dispense Refill    ketoconazole (NIZOral) 2 % cream Apply sparingly to affected area twice daily (Patient not taking: Reported on 8/6/2024) 30 g 0    multivitamin tablet Take 1 tablet by mouth once daily.      tirzepatide, weight loss, (Zepbound) 5 mg/0.5 mL injection Inject 5 mg under the skin every 7 days. 4 each 2     No current facility-administered medications for this visit.        OBJECTIVE    PHYSICAL EXAM      8/4/2023     1:43 PM 12/8/2023     3:34 PM 4/2/2024    10:51 AM 6/11/2024     3:25 PM 7/15/2024     1:34 PM   Vitals   Systolic 122 118 117 118    Diastolic 76 74 79 83    Heart Rate 64 81 54 64    Temp 36.7 °C (98 °F) 36.8 °C (98.2 °F) 36.2 °C (97.2 °F) 36.7 °C (98 °F)    Resp 18 18 18 18    Height (in) 1.778 m (5' 10\") 1.778 m (5' 10\") 1.778 m (5' 10\") 1.778 m (5' 10\") 1.778 m (5' 10\")   Weight (lb) 212 221.8 207 204 205   BMI 30.42 kg/m2 31.82 kg/m2 29.7 kg/m2 29.27 kg/m2 29.41 kg/m2   BSA (m2) 2.18 m2 2.23 m2 2.15 m2 2.14 m2 2.14 m2   Visit Report " Report Report Report Report Report      A focused exam was performed today.  His skin is intact.  He achieves full extension and 130 degrees of flexion.  Good quadricep contraction and trace effusion.  Positive Lachman.  Negative joint line tenderness and stable collateral ligaments.  Bilateral lower extremity compartments supple.  5 out of 5 distal motor strength bilaterally.  L4-S1 sensation intact bilaterally.  2+ DP/PT pulses bilaterally.    ASSESSMENT & PLAN    Impression: 43 y.o. male with right knee ACL injury.    Plan:   The patient, his wife and I reviewed his surgical plan for outpatient right knee arthroscopic ACL reconstruction with patellar tendon autograft.  We discussed the timing on crutches and postoperative rehabilitation.  We again reviewed the risks of the procedure including bleeding, infection, venous thromboembolism, persistent pain and recurrent knee instability.    At the end of the visit, all questions were answered in full. The patient is in agreement with the plan and recommendations. They will call the office with any questions/concerns.    Note dictated with [facility] software. Completed without full typed error editing and sent to avoid delay.

## 2024-08-20 ENCOUNTER — HOSPITAL ENCOUNTER (OUTPATIENT)
Facility: HOSPITAL | Age: 44
Setting detail: OUTPATIENT SURGERY
Discharge: HOME | End: 2024-08-20
Attending: ORTHOPAEDIC SURGERY | Admitting: ORTHOPAEDIC SURGERY
Payer: COMMERCIAL

## 2024-08-20 ENCOUNTER — ANESTHESIA (OUTPATIENT)
Dept: OPERATING ROOM | Facility: HOSPITAL | Age: 44
End: 2024-08-20
Payer: COMMERCIAL

## 2024-08-20 ENCOUNTER — ANESTHESIA EVENT (OUTPATIENT)
Dept: OPERATING ROOM | Facility: HOSPITAL | Age: 44
End: 2024-08-20
Payer: COMMERCIAL

## 2024-08-20 VITALS
WEIGHT: 209.44 LBS | DIASTOLIC BLOOD PRESSURE: 90 MMHG | OXYGEN SATURATION: 95 % | HEIGHT: 70 IN | HEART RATE: 74 BPM | TEMPERATURE: 98.1 F | BODY MASS INDEX: 29.98 KG/M2 | SYSTOLIC BLOOD PRESSURE: 138 MMHG | RESPIRATION RATE: 16 BRPM

## 2024-08-20 DIAGNOSIS — S83.511D DISRUPTION OF ANTERIOR CRUCIATE LIGAMENT OF RIGHT KNEE, SUBSEQUENT ENCOUNTER: Primary | ICD-10-CM

## 2024-08-20 PROCEDURE — 3700000001 HC GENERAL ANESTHESIA TIME - INITIAL BASE CHARGE: Performed by: ORTHOPAEDIC SURGERY

## 2024-08-20 PROCEDURE — A4550 SURGICAL TRAYS: HCPCS | Performed by: ORTHOPAEDIC SURGERY

## 2024-08-20 PROCEDURE — 29888 ARTHRS AID ACL RPR/AGMNTJ: CPT | Performed by: ORTHOPAEDIC SURGERY

## 2024-08-20 PROCEDURE — 3700000002 HC GENERAL ANESTHESIA TIME - EACH INCREMENTAL 1 MINUTE: Performed by: ORTHOPAEDIC SURGERY

## 2024-08-20 PROCEDURE — 2500000005 HC RX 250 GENERAL PHARMACY W/O HCPCS: Performed by: ORTHOPAEDIC SURGERY

## 2024-08-20 PROCEDURE — 7100000002 HC RECOVERY ROOM TIME - EACH INCREMENTAL 1 MINUTE: Performed by: ORTHOPAEDIC SURGERY

## 2024-08-20 PROCEDURE — 7100000010 HC PHASE TWO TIME - EACH INCREMENTAL 1 MINUTE: Performed by: ORTHOPAEDIC SURGERY

## 2024-08-20 PROCEDURE — 2780000003 HC OR 278 NO HCPCS: Performed by: ORTHOPAEDIC SURGERY

## 2024-08-20 PROCEDURE — 2500000004 HC RX 250 GENERAL PHARMACY W/ HCPCS (ALT 636 FOR OP/ED): Performed by: ANESTHESIOLOGY

## 2024-08-20 PROCEDURE — 7100000009 HC PHASE TWO TIME - INITIAL BASE CHARGE: Performed by: ORTHOPAEDIC SURGERY

## 2024-08-20 PROCEDURE — 2500000004 HC RX 250 GENERAL PHARMACY W/ HCPCS (ALT 636 FOR OP/ED): Performed by: PHYSICIAN ASSISTANT

## 2024-08-20 PROCEDURE — 2720000007 HC OR 272 NO HCPCS: Performed by: ORTHOPAEDIC SURGERY

## 2024-08-20 PROCEDURE — 7100000001 HC RECOVERY ROOM TIME - INITIAL BASE CHARGE: Performed by: ORTHOPAEDIC SURGERY

## 2024-08-20 PROCEDURE — C1713 ANCHOR/SCREW BN/BN,TIS/BN: HCPCS | Performed by: ORTHOPAEDIC SURGERY

## 2024-08-20 PROCEDURE — 2500000004 HC RX 250 GENERAL PHARMACY W/ HCPCS (ALT 636 FOR OP/ED): Mod: JZ | Performed by: PHYSICIAN ASSISTANT

## 2024-08-20 PROCEDURE — 2500000004 HC RX 250 GENERAL PHARMACY W/ HCPCS (ALT 636 FOR OP/ED): Performed by: ANESTHESIOLOGIST ASSISTANT

## 2024-08-20 PROCEDURE — 3600000004 HC OR TIME - INITIAL BASE CHARGE - PROCEDURE LEVEL FOUR: Performed by: ORTHOPAEDIC SURGERY

## 2024-08-20 PROCEDURE — 2500000004 HC RX 250 GENERAL PHARMACY W/ HCPCS (ALT 636 FOR OP/ED): Performed by: ORTHOPAEDIC SURGERY

## 2024-08-20 PROCEDURE — 3600000009 HC OR TIME - EACH INCREMENTAL 1 MINUTE - PROCEDURE LEVEL FOUR: Performed by: ORTHOPAEDIC SURGERY

## 2024-08-20 PROCEDURE — 2500000005 HC RX 250 GENERAL PHARMACY W/O HCPCS: Performed by: ANESTHESIOLOGIST ASSISTANT

## 2024-08-20 DEVICE — BIOSURE REGENSORB INTERFERENCE                                    SCREW 6 MM X 20MM
Type: IMPLANTABLE DEVICE | Site: KNEE | Status: FUNCTIONAL
Brand: BIOSURE

## 2024-08-20 DEVICE — BIOSURE REGENSORB INTERFERENCE                                    SCREW 7 MM X 25MM
Type: IMPLANTABLE DEVICE | Site: KNEE | Status: FUNCTIONAL
Brand: BIOSURE

## 2024-08-20 RX ORDER — DOCUSATE SODIUM 100 MG/1
100 CAPSULE, LIQUID FILLED ORAL 2 TIMES DAILY
Qty: 30 CAPSULE | Refills: 0 | Status: SHIPPED | OUTPATIENT
Start: 2024-08-20 | End: 2024-09-04

## 2024-08-20 RX ORDER — MEPERIDINE HYDROCHLORIDE 25 MG/ML
12.5 INJECTION INTRAMUSCULAR; INTRAVENOUS; SUBCUTANEOUS EVERY 10 MIN PRN
Status: DISCONTINUED | OUTPATIENT
Start: 2024-08-20 | End: 2024-08-20 | Stop reason: HOSPADM

## 2024-08-20 RX ORDER — HYDRALAZINE HYDROCHLORIDE 20 MG/ML
5 INJECTION INTRAMUSCULAR; INTRAVENOUS EVERY 30 MIN PRN
Status: DISCONTINUED | OUTPATIENT
Start: 2024-08-20 | End: 2024-08-20 | Stop reason: HOSPADM

## 2024-08-20 RX ORDER — LABETALOL HYDROCHLORIDE 5 MG/ML
5 INJECTION, SOLUTION INTRAVENOUS ONCE AS NEEDED
Status: DISCONTINUED | OUTPATIENT
Start: 2024-08-20 | End: 2024-08-20 | Stop reason: HOSPADM

## 2024-08-20 RX ORDER — LIDOCAINE HYDROCHLORIDE 10 MG/ML
INJECTION, SOLUTION EPIDURAL; INFILTRATION; INTRACAUDAL; PERINEURAL AS NEEDED
Status: DISCONTINUED | OUTPATIENT
Start: 2024-08-20 | End: 2024-08-20

## 2024-08-20 RX ORDER — OXYCODONE AND ACETAMINOPHEN 5; 325 MG/1; MG/1
1 TABLET ORAL EVERY 4 HOURS PRN
Qty: 25 TABLET | Refills: 0 | Status: SHIPPED | OUTPATIENT
Start: 2024-08-20

## 2024-08-20 RX ORDER — CEFAZOLIN SODIUM 2 G/100ML
2 INJECTION, SOLUTION INTRAVENOUS ONCE
Status: COMPLETED | OUTPATIENT
Start: 2024-08-20 | End: 2024-08-20

## 2024-08-20 RX ORDER — SODIUM CHLORIDE, SODIUM LACTATE, POTASSIUM CHLORIDE, CALCIUM CHLORIDE 600; 310; 30; 20 MG/100ML; MG/100ML; MG/100ML; MG/100ML
20 INJECTION, SOLUTION INTRAVENOUS CONTINUOUS
Status: DISCONTINUED | OUTPATIENT
Start: 2024-08-20 | End: 2024-08-20 | Stop reason: HOSPADM

## 2024-08-20 RX ORDER — BUPIVACAINE HYDROCHLORIDE 5 MG/ML
INJECTION, SOLUTION PERINEURAL AS NEEDED
Status: DISCONTINUED | OUTPATIENT
Start: 2024-08-20 | End: 2024-08-20

## 2024-08-20 RX ORDER — FENTANYL CITRATE 50 UG/ML
100 INJECTION, SOLUTION INTRAMUSCULAR; INTRAVENOUS ONCE
Status: COMPLETED | OUTPATIENT
Start: 2024-08-20 | End: 2024-08-20

## 2024-08-20 RX ORDER — DEXAMETHASONE SODIUM PHOSPHATE 10 MG/ML
INJECTION INTRAMUSCULAR; INTRAVENOUS AS NEEDED
Status: DISCONTINUED | OUTPATIENT
Start: 2024-08-20 | End: 2024-08-20

## 2024-08-20 RX ORDER — KETOROLAC TROMETHAMINE 30 MG/ML
30 INJECTION, SOLUTION INTRAMUSCULAR; INTRAVENOUS ONCE AS NEEDED
Status: DISCONTINUED | OUTPATIENT
Start: 2024-08-20 | End: 2024-08-20 | Stop reason: HOSPADM

## 2024-08-20 RX ORDER — PROPOFOL 10 MG/ML
INJECTION, EMULSION INTRAVENOUS AS NEEDED
Status: DISCONTINUED | OUTPATIENT
Start: 2024-08-20 | End: 2024-08-20

## 2024-08-20 RX ORDER — FENTANYL CITRATE 50 UG/ML
INJECTION, SOLUTION INTRAMUSCULAR; INTRAVENOUS AS NEEDED
Status: DISCONTINUED | OUTPATIENT
Start: 2024-08-20 | End: 2024-08-20

## 2024-08-20 RX ORDER — ASPIRIN 325 MG
325 TABLET, DELAYED RELEASE (ENTERIC COATED) ORAL DAILY
Qty: 15 TABLET | Refills: 0 | Status: SHIPPED | OUTPATIENT
Start: 2024-08-21

## 2024-08-20 RX ORDER — ALBUTEROL SULFATE 0.83 MG/ML
2.5 SOLUTION RESPIRATORY (INHALATION) ONCE AS NEEDED
Status: DISCONTINUED | OUTPATIENT
Start: 2024-08-20 | End: 2024-08-20 | Stop reason: HOSPADM

## 2024-08-20 RX ORDER — MIDAZOLAM HYDROCHLORIDE 1 MG/ML
2 INJECTION, SOLUTION INTRAMUSCULAR; INTRAVENOUS ONCE
Status: COMPLETED | OUTPATIENT
Start: 2024-08-20 | End: 2024-08-20

## 2024-08-20 RX ORDER — ONDANSETRON HYDROCHLORIDE 2 MG/ML
INJECTION, SOLUTION INTRAVENOUS AS NEEDED
Status: DISCONTINUED | OUTPATIENT
Start: 2024-08-20 | End: 2024-08-20

## 2024-08-20 RX ORDER — SODIUM CHLORIDE, SODIUM LACTATE, POTASSIUM CHLORIDE, CALCIUM CHLORIDE 600; 310; 30; 20 MG/100ML; MG/100ML; MG/100ML; MG/100ML
100 INJECTION, SOLUTION INTRAVENOUS CONTINUOUS
Status: DISCONTINUED | OUTPATIENT
Start: 2024-08-20 | End: 2024-08-20 | Stop reason: HOSPADM

## 2024-08-20 RX ORDER — ONDANSETRON 4 MG/1
4 TABLET, FILM COATED ORAL EVERY 8 HOURS PRN
Qty: 20 TABLET | Refills: 0 | Status: SHIPPED | OUTPATIENT
Start: 2024-08-20

## 2024-08-20 RX ORDER — ONDANSETRON HYDROCHLORIDE 2 MG/ML
4 INJECTION, SOLUTION INTRAVENOUS ONCE AS NEEDED
Status: COMPLETED | OUTPATIENT
Start: 2024-08-20 | End: 2024-08-20

## 2024-08-20 RX ORDER — FENTANYL CITRATE 50 UG/ML
50 INJECTION, SOLUTION INTRAMUSCULAR; INTRAVENOUS EVERY 5 MIN PRN
Status: DISCONTINUED | OUTPATIENT
Start: 2024-08-20 | End: 2024-08-20 | Stop reason: HOSPADM

## 2024-08-20 SDOH — HEALTH STABILITY: MENTAL HEALTH: CURRENT SMOKER: 0

## 2024-08-20 ASSESSMENT — PAIN SCALES - GENERAL
PAINLEVEL_OUTOF10: 2
PAINLEVEL_OUTOF10: 2
PAINLEVEL_OUTOF10: 4
PAINLEVEL_OUTOF10: 3
PAINLEVEL_OUTOF10: 2
PAINLEVEL_OUTOF10: 0 - NO PAIN
PAIN_LEVEL: 2
PAINLEVEL_OUTOF10: 4
PAINLEVEL_OUTOF10: 5 - MODERATE PAIN
PAINLEVEL_OUTOF10: 0 - NO PAIN
PAINLEVEL_OUTOF10: 6
PAINLEVEL_OUTOF10: 0 - NO PAIN
PAINLEVEL_OUTOF10: 5 - MODERATE PAIN

## 2024-08-20 ASSESSMENT — PAIN - FUNCTIONAL ASSESSMENT
PAIN_FUNCTIONAL_ASSESSMENT: 0-10

## 2024-08-20 ASSESSMENT — COLUMBIA-SUICIDE SEVERITY RATING SCALE - C-SSRS
6. HAVE YOU EVER DONE ANYTHING, STARTED TO DO ANYTHING, OR PREPARED TO DO ANYTHING TO END YOUR LIFE?: NO
2. HAVE YOU ACTUALLY HAD ANY THOUGHTS OF KILLING YOURSELF?: NO
1. IN THE PAST MONTH, HAVE YOU WISHED YOU WERE DEAD OR WISHED YOU COULD GO TO SLEEP AND NOT WAKE UP?: NO

## 2024-08-20 NOTE — DISCHARGE INSTRUCTIONS
Heath Ruiz M.D.  Department of Orthopedics  [address] [place] [address]  Office: [phone]    POST OPERATIVE INSTRUCTIONS FOR ACL RECONSTRUCTION    General Anesthesia or Sedation  You have been given medications that affect your balance and coordination for 24 hours.  Go directly home from the hospital and rest for the remainder of the day.  Have a responsible adult stay with you today and overnight.  Do not drive or operate equipment, conduct business or sign any legal documents for the next 24 hours or while taking pain medication.  Do not drink alcohol, take tranquilizers or sleeping pills for the next 24 hours or while taking pain medication.  Deep breathe and cough two times at least every 4 hours today and tomorrow while you are awake. This will help keep fevers away.   Use your CPAP or BiPAP machine whenever sleeping during the day or night.    Diet  Start a light meal and advance to your regular diet as tolerated    Dressing/Wound Care  Keep your dressing clean and dry until seen in the office or by physical therapy  You dressing will be removed at your first post op appointment with the surgical team or with physical therapy.  You may see some spotting or drainage on your dressing, this is normal.  The purpose of the dressing is to apply pressure to the incision and to soak up any discharge or drainage from the incision.  Once the dressing has been removed, inspect the incisions daily for and changes. Some bleeding or light draining may be on the dressing. Bruising around the incisions, the back of the knee and down the shin are normal and will fade away.     Showering/Bathing  Once the dressing has been removed you may shower. Please cover the incisions with water proof band aids or a plastic wrap. Incisions should stay dry until sutures have been removed.   Allow soap and water to gently run over the operative area and pat dry when covered until incisions are fully  healed            Activity and Exercise  Wear your immobilizer or brace until follow up appointment.  Your knee brace is set at a range of motion of 0 degrees. It will be adjusted once seen by the surgical team or physical therapy  Begin  Sports Medicine leg exercises (see instruction sheet) on post op day 1.  Your weight bearing status until your follow up appointment is:  Non-weight bearing - operative extremity may not bear any weight and you must use crutches at all times. Weight bearing status will be adjusted once seen by surgical team or physical therapy.    **Physical Therapy can start 3 - 4 days post op. Please schedule. Your physical order should be in the pre-surgery packet you were sent. If you do not have one, please contact the office.     Ice/Polar Care  Apply an ice pack every 2 hours for 20 - 30 minutes while awake for the first 2 - 3 days.  Then 3 - 5 times a day for 20 minutes until seen by your surgeon.  Never place an ice pack directly on skin.  Always have a barrier such as a towel between the ice pack and your skin.  Your Polar Care unit may be used continuously for the first 2 days postoperatively, then 3 - 5 times daily for 30 minutes until seen by your surgeon.  Refill with ice and water as needed.    Elevation  Elevating your operative extremity will lessen swelling and discomfort.    Support Hose  Wear the support hose sent home with you at all times if you were provided them.  Please take the additional hose with you to the Physical Therapist or Physician office to put on your surgical leg after the dressing is removed.  You may take the hose off to hand wash and air dry, do not place them in the washer or dryer.  You will need to wear the support hose until cleared by your physician.  Wearing compression stockings, using blood thinning medications (typically aspirin), and performing ankle pumps help prevent blood clots!        Medications  Pain medications should be taken with  food.  You may experience dizziness or drowsiness while taking your prescribed pain medication.   DO NOT DRIVE!  DO NOT DRINK ALCOHOL!  Pain medication can be constipating, drink plenty of fluids and increase your fiber intake to avoid this problem.  If you develop constipation, Colace is an over the counter stool softener you may use.  New Take Home Medications - Take as directed on bottle.    Resume your prescription medications as directed by your physician.    Do not take other medications containing Tylenol while on your pain medications.    When To Notify Your Physician  Persistent temperature greater than 101°F.  Increase or severe pain that does not respond to elevation, ice or pain medication.  Unexplained redness, swelling, numbness or tingling that does not improve with elevation or ice.  Increased amount or change in color of drainage.  Persistent nausea and vomiting.  Fingers and toes should remain pink and warm.  Notify your doctor if they become cool, grey or numb.  If you cannot reach your surgeon, seek care at an Urgent Care Center or Emergency Room.      For questions or concerns regarding your care please contact your surgeon      Dr. Heath Ruiz    [phone]   Remi Torres PA-C    [phone]   After hours and weekends   [phone]    Post Operative Appointment:  Please call [facility] at [phone] to schedule your first appointment with Remi Torres PA-C in 10-12 days if not already scheduled.    PLEASE NOTE:  Additional information and instruction will be provided by your physician regarding your surgical procedure and continued care at your initial post operative office appointment.               Heel Slide:   If brace is on wait on this. Sitting, pull foot towards body.    Assist stretch with hands. Hold for 5 seconds, then bend a   little bit farther and hold for another 5 seconds then relax.  Repeat 15 times, 6 times per day.           Heel Prop:  Whenever sitting, place heel on a  footrest. Heel must  be high enough so your calf and thigh are off the ground.      Towel/Cord Stretch-Toe pulls:       Sitting, wrap towel or cord around foot.  Pull   With one hand to raise foot.  Stabilize your leg  by placing your other hand on your thigh. Pull  on strap with thigh muscle relaxed for 5 seconds.  Then contract thigh muscle while releasing cord  and hold heel up for 5 seconds. Repeat  sequence 10 times, 6 times per day.      Quad Sets:   Tighten thigh muscle while pushing your knee   down into the towel.  Hold for 5 seconds then rest   for 5 seconds and repeat.  Perform 10 times, 6   times per day.    Straight Leg Raise:          Sit with back supported, or lay down. Tighten front thigh muscle, pull toe   back toward you, then lift leg 8-10   inches off surface. Pause at the top and   lower   slowly to start position. Repeat 15   times, 6 times per day. Sometimes this is easier a week after surgery.       ** Extension Habit ** When rising to stand, shift weight to involved leg and tighten thigh muscle to lock out the knee.  Hold for 10 seconds.    ** Don't forget ankle pumps throughout the day as well!!

## 2024-08-20 NOTE — ANESTHESIA PREPROCEDURE EVALUATION
Patient: Lorenzo Zhu    Procedure Information       Date/Time: 08/20/24 0945    Procedure: RIGHT Knee ACL Reconstruction with patella tendon autograft (LMA/FNB, S&N ACL set, meniscus Repair devices, suture anchors ) (Right: Knee) - LMA/FNB    Location: [facility] OR 06 / Virtual [facility]    Surgeons: Heath Ruiz MD            Relevant Problems   No relevant active problems       Clinical information reviewed:                   NPO Detail:  No data recorded     Physical Exam    Airway  Mallampati: I  TM distance: >3 FB  Neck ROM: full     Cardiovascular   Comments: deferred   Dental    Pulmonary   Comments: deferred   Abdominal     Comments: deferred           Anesthesia Plan    History of general anesthesia?: yes  History of complications of general anesthesia?: no    ASA 1     general and regional   (Femoral nerve block plus LMA general)  The patient is not a current smoker.  Patient did not smoke on day of procedure.  Education provided regarding risk of obstructive sleep apnea.  intravenous induction   Postoperative administration of opioids is intended.  Anesthetic plan and risks discussed with patient.    Plan discussed with CAA.

## 2024-08-20 NOTE — PERIOPERATIVE NURSING NOTE
2 + dp on right foot warm to touch with capillary refill less than 2 seconds 5/5 dorsal and plantar flexion strengths on the right

## 2024-08-20 NOTE — ANESTHESIA POSTPROCEDURE EVALUATION
Patient: Lorenzo Zhu    Procedure Summary       Date: 08/20/24 Room / Location: [facility] 06 / [facility]    Anesthesia Start: 0926 Anesthesia Stop: 1143    Procedure: RIGHT Knee ACL Reconstruction with patella tendon autograft (LMA/FNB, S&N ACL set, meniscus Repair devices, suture anchors ) (Right: Knee) Diagnosis:       Disruption of anterior cruciate ligament of right knee, subsequent encounter      (Disruption of anterior cruciate ligament of right knee, subsequent encounter [X09.266S])    Surgeons: Heath Ruiz MD Responsible Provider: Karlos Almonte MD    Anesthesia Type: general, regional ASA Status: 1            Anesthesia Type: general, regional    Vitals Value Taken Time   /84 08/20/24 1310   Temp 37.1 °C (98.8 °F) 08/20/24 1310   Pulse 71 08/20/24 1310   Resp 16 08/20/24 1310   SpO2 94 % 08/20/24 1310       Anesthesia Post Evaluation    Patient location during evaluation: PACU  Patient participation: complete - patient participated  Level of consciousness: awake and alert  Pain score: 2  Pain management: adequate  Multimodal analgesia pain management approach  Airway patency: patent  Two or more strategies used to mitigate risk of obstructive sleep apnea  Cardiovascular status: acceptable and blood pressure returned to baseline  Respiratory status: acceptable  Hydration status: acceptable  Postoperative Nausea and Vomiting: none        There were no known notable events for this encounter.

## 2024-08-20 NOTE — BRIEF OP NOTE
Date: 2024  OR Location: [facility]    Name: Lorenzo Zhu, : 1980, Age: 43 y.o., MRN: [id number], Sex: male    Diagnosis  Pre-op Diagnosis      * Disruption of anterior cruciate ligament of right knee, subsequent encounter [S83.302D] Post-op Diagnosis     * Disruption of anterior cruciate ligament of right knee, subsequent encounter [N78.104D]     Procedures  Right knee arthroscopic anterior cruciate ligament reconstruction with patellar tendon autograft  Right knee diagnostic arthroscopy and examination under anesthesia    Surgeons      * Heath Ruiz - Primary    Resident/Fellow/Other Assistant:  Surgeons and Role:     * Wojciech Giles PA-C - Assisting    Procedure Summary  Anesthesia: Regional, General  ASA: I  Anesthesia Staff: Anesthesiologist: Karlos Almonte MD  C-AA: JACK Carvajal  Estimated Blood Loss: 5 mL  Intra-op Medications:   Administrations occurring from 0945 to 1145 on 24:   Medication Name Total Dose   EPINEPHrine (Adrenalin) 1 mg in sodium chloride 0.9 % 3,000 mL irrigation 3,000 mL   lactated Ringer's infusion 925 mL          Anesthesia Record               Intraprocedure I/O Totals       None           Specimen: No specimens collected     Staff:   Circulator: Bia  Scrub Person: Prashant  Scrub Person: Ana    Findings: ACL tear    Complications:  None; patient tolerated the procedure well.     Disposition: PACU - hemodynamically stable.  Condition: stable  Specimens Collected: No specimens collected  Attending Attestation: I was present and scrubbed for the entire procedure.    Heath Ruiz  Phone Number: [phone]

## 2024-08-20 NOTE — ANESTHESIA PROCEDURE NOTES
Airway  Date/Time: 8/20/2024 9:31 AM  Urgency: elective      Staffing  Performed: JACK   Authorized by: Karlos Almonte MD    Performed by: JACK Carvajal  Patient location during procedure: OR    Indications and Patient Condition  Indications for airway management: anesthesia  Spontaneous Ventilation: absent  Sedation level: deep  Preoxygenated: yes  Mask difficulty assessment: 1 - vent by mask    Final Airway Details  Final airway type: supraglottic airway      Successful airway: Size 4     Number of attempts at approach: 1

## 2024-08-20 NOTE — ANESTHESIA PROCEDURE NOTES
Peripheral Block    Patient location during procedure: pre-op  Start time: 8/20/2024 9:18 AM  End time: 8/20/2024 9:19 AM  Reason for block: at surgeon's request and post-op pain management  Staffing  Performed: attending   Authorized by: Karlos Almonte MD    Performed by: Karlos Almonte MD  Preanesthetic Checklist  Completed: patient identified, IV checked, site marked, risks and benefits discussed, surgical consent, monitors and equipment checked, pre-op evaluation and timeout performed   Timeout performed at: 8/20/2024 9:13 AM  Peripheral Block  Patient position: laying flat  Prep: ChloraPrep and site prepped and draped  Patient monitoring: heart rate, cardiac monitor and continuous pulse ox  Block type: femoral  Laterality: right  Injection technique: single-shot  Guidance: nerve stimulator and ultrasound guided  Needle  Needle gauge: 22 G  Needle length: 5 cm  Needle localization: ultrasound guidance     image stored in chart  Needle insertion depth: 3 cm  Test dose: negative  Assessment  Injection assessment: negative aspiration for heme, no paresthesia on injection, local visualized surrounding nerve on ultrasound and incremental injection  Paresthesia pain: immediately resolved  Heart rate change: no  Slow fractionated injection: yes  Additional Notes  Dexamethasone 10mg  added

## 2024-08-20 NOTE — OP NOTE
RIGHT Knee ACL Reconstruction with patellar tendon autograft, medial meniscus repair (R) Operative Note     Date: 2024  OR Location: [facility]    Name: Lorenzo Zhu, : 1980, Age: 43 y.o., MRN: [id number], Sex: male    Diagnosis  Pre-op Diagnosis      * Disruption of anterior cruciate ligament of right knee, subsequent encounter [S83.380D] Post-op Diagnosis     * Disruption of anterior cruciate ligament of right knee, subsequent encounter [S83.423D]     Procedures  Right knee arthroscopic ACL reconstruction with BTB autograft  Right knee diagnostic arthroscopy and examination under anesthesia    Surgeons      * Heath Ruiz - Primary    Resident/Fellow/Other Assistant:  SARAH Bailon    Procedure Summary  Anesthesia: Regional and LMA ASA: I  Anesthesia Staff: Anesthesiologist: Karlos Almonte MD  C-AA: JACK Carvajal  Estimated Blood Loss: 5mL  Intra-op Medications:   Medication Name Total Dose   ceFAZolin in dextrose (iso-os) (Ancef) IVPB 2 g 2 g   fentaNYL PF (Sublimaze) injection 50 mcg 50 mcg   midazolam (Versed) injection 2 mg 2 mg          Anesthesia Record               Intraprocedure I/O Totals          Intake    ceFAZolin in dextrose (iso-os) (Ancef) IVPB 2 g 100.00 mL    Total Intake 100 mL          Specimen: No specimens collected     Staff:   Circulator: Bia Villegas RN  Scrub Person: Prashant Gibbons; Ana Ortiz RN     Drains and/or Catheters: None    Tourniquet Times: 55 min    Implants:  Implants       Type Name Action Serial No.              Screw SCREW, BIOSURE REGENESORB, 6 X 20MM - [id number] Implanted      Screw SCREW, BIOSURE REGENESORB, 7 X 25MM - [id number] Implanted                     Findings: ACL tear    Indications: Lorenzo Zhu is a 43 y.o. active male who suffered a right knee anterior cruciate ligament injury during sporting activities. The patient continues to report knee pain, instability and swelling. Physical examination and MRI confirmed findings. Knee  arthroscopy and reconstruction is indicated. Risks and benefits were discussed with the patient. After questions were answered consent was obtained to proceed. In the holding area of the right knee was signed as the appropriate operative site, and the patient was positively identified. We agreed upon the use of autograft tissue.    Procedure: In the operative suite the patient was placed supine on the table. An LMA was inserted by the anesthesiologist. A right thigh tourniquet was placed. The right lower extremity was then prepped and draped in the usual sterile fashion. A timeout was performed and 2 g Ancef were given intravenously prior to initiating the procedure.  Right knee examination under anesthesia was performed. Full range of motion. Stable medial collateral ligament. Stable lateral collateral ligament. Stable posterior drawer. Positive Lachman and anterior drawer. Positive pivot glide.  The procedure was initiated by harvesting the patellar tendon through a longitudinal incision over the anterior knee. Superficial bleeders were cauterized. The peritenon was then incised and protected for later repair. The central 10 mm of the patellar tendon was then harvested with attached 10 x 20 mm bone plugs harvested from the tibial tubercle and patella with an oscillating saw, respectively. The graft was harvested without incident and was of excellent quality.  On the back table, SARAH Caicedo prepared the anterior cruciate ligament graft. The graft was prepared with cylindrical bone plugs through its drill holes were placed for passing sutures. The graft was prepared without incident and was of excellent quality.  A standard 2 portal diagnostic arthroscopy technique was utilized. The camera was inserted into the joint in an atraumatic fashion.  The suprapatellar pouch and gutters were unremarkable and free of debris.  The patellofemoral articular cartilage was intact.  The medial compartment was then entered. Medial  femoral condyle articular cartilage was intact. The medial meniscus was probed and intact.  The lateral compartment was entered. Lateral femoral condyle articular cartilage was intact. The lateral meniscus was probed and intact.  The notch was then entered. The PCL was intact. The anterior cruciate ligament was ruptured at its mid substance. The notch was slightly narrow.  The anterior cruciate ligament was then debrided back to its footprints. A 5.5 mm shaver was utilized to perform a notchplasty along with the use of a 70° arthroscope. This opened up the slightly stenotic notch and allowed for visualization of the back wall. Using a medial portal technique a flexible guidepin was placed in the center of the anterior cruciate ligament footprint. A flexible reamer was then used to drill a 10 x 25 mm tunnel leaving a 2 mm back wall. Care was taken to protect the medial femoral condyle with reaming. The tunnel was reamed without incident and was in an anatomic position. A passing suture was pulled through for later graft passage.  The tibial tunnel was then reamed over a guide pin placed in the center of the anterior cruciate ligament tibial footprint. A 10 mm cylindrical reamer was used to create the tunnel. The reamings were saved for later autogenous bone grafting. The tibial tunnel was in excellent position.  The knee was then lavaged and suctioned of debris.  Right knee arthroscopic anterior cruciate ligament reconstruction with bone-patellar tendon-bone autograft was then completed by passing the graft into the knee. The femoral bone plug docked nicely.  The femoral bone plug was secured with a 6 x 20 mm bio composite screw for an excellent interference fit.  The knee was then cycled and brought into extension. There was no evidence of notch impingement. Tension was held on the graft and the knee was brought again into extension.  The tibial bone plug was secured with a 7 x 25 mm bio composite screw to complete  graft fixation with a secure interference fit.  The anterior cruciate ligament graft was then probed and stable. The Lachman and pivot shift were performed and stable.  The knee was then lavaged and suctioned of debris. Instruments were removed and fluid expelled. Portals were closed with interrupted 3-0 nylon sutures. The central one third of the patellar tendon was then reapproximated with interrupted 0 Vicryl sutures. The patella and tibial harvest sites were bone grafted with autogenous bone graft. The peritenon was then closed over the top with interrupted 2-0 nylon sutures. A layered closure was performed in the skin followed by running subcuticular Monocryl and Steri-Strips.  All sponge counts and needle counts are correct. There were no complications. A Xeroform and sterile gauze dressing was applied followed by a bulky cotton web roll, Ace wrap and hinged knee brace locked in extension. A cryotherapy device is utilized. The patient was transported awake, extubated, and in stable condition to the recovery room without incident.  Postoperative DVT prophylaxis included aspirin, compressive stockings, active ankle pumps and early ambulation.  SARAH Bailon was present for the entire case. His assistance was necessary and critical to the successful completion of the procedure. He provided skilled assistance with preparation of the anterior cruciate ligament graft, graft passage and skin closure. A qualified assistant was not available to perform his portion of the case.    Complications:  None; patient tolerated the procedure well.    Disposition: PACU - hemodynamically stable.  Condition: stable   Attending Attestation: I was present and scrubbed for the entire procedure.    Heath Ruiz  Phone Number: [phone]

## 2024-08-21 ASSESSMENT — PAIN SCALES - GENERAL: PAINLEVEL_OUTOF10: 2

## 2024-08-23 ENCOUNTER — EVALUATION (OUTPATIENT)
Dept: PHYSICAL THERAPY | Facility: CLINIC | Age: 44
End: 2024-08-23
Payer: COMMERCIAL

## 2024-08-23 DIAGNOSIS — S83.511A DISRUPTION OF ANTERIOR CRUCIATE LIGAMENT OF KNEE, RIGHT, INITIAL ENCOUNTER: ICD-10-CM

## 2024-08-23 DIAGNOSIS — M25.561 RIGHT KNEE PAIN: Primary | ICD-10-CM

## 2024-08-23 PROCEDURE — 97110 THERAPEUTIC EXERCISES: CPT | Mod: GP

## 2024-08-23 PROCEDURE — 97161 PT EVAL LOW COMPLEX 20 MIN: CPT | Mod: GP

## 2024-08-23 PROCEDURE — 97140 MANUAL THERAPY 1/> REGIONS: CPT | Mod: GP

## 2024-08-23 ASSESSMENT — ENCOUNTER SYMPTOMS
OCCASIONAL FEELINGS OF UNSTEADINESS: 1
DEPRESSION: 0
LOSS OF SENSATION IN FEET: 0

## 2024-08-23 NOTE — PROGRESS NOTES
Physical Therapy    Physical Therapy Evaluation and Treatment      Patient Name: Lorenzo Zhu  MRN: [id number]  Today's Date: 8/23/2024    Time Entry:   Time Calculation  Start Time: 1100  Stop Time: 1141  Time Calculation (min): 41 min  PT Evaluation Time Entry  PT Evaluation (Low) Time Entry: 21  PT Therapeutic Procedures Time Entry  Manual Therapy Time Entry: 10  Therapeutic Exercise Time Entry: 10                 Visit #1  50 COPAY, 500 DED (MET) 2250 OOP MAX (MET)   40 VS SONALI YR PT/OT, NO AUTH   Med [place]     Assessment:   Pt presents s/p R ACL repair on 8/20/24(patellar graft) with Dr. Ruiz. Patient demonstrates post-operative impairments of R knee pain, weakness and ROM limits. Patient would benefit from PT services to address current impairments and facilitate improvement in current activity limits. Educated patient on current POC, initial HEP and current examination findings. Patient verbalized understanding of all education and instruction provided today.     Plan:  OP PT Plan  Treatment/Interventions: Dry needling, Education/ Instruction, Gait training, Manual therapy, Neuromuscular re-education, Self care/ home management, Taping techniques, Therapeutic activities, Therapeutic exercises, Vasopneumatic device  PT Plan: Skilled PT  PT Frequency: 2 times per week  Duration: 20  Certification Period Start Date: 08/23/24  Certification Period End Date: 11/21/24  Number of Treatments Authorized: 50  Rehab Potential: Good  Plan of Care Agreement: Patient    Current Problem:   1. Right knee pain        2. Disruption of anterior cruciate ligament of knee, right, initial encounter  Referral to Physical Therapy          Subjective    General:  Patient presents s/p R arthroscopic ACL reconstruction with patellar graft on 8/20/24 with Dr. Ruiz. Patient denies any falls, numbness/tingling or bowel/bladder dysfunction since surgery. Pt currently ambulating with bilateral axillary crutches and donned knee  immobilizer. Patient has been managing post-op pain with prescribed medication and ice.     Precautions:  24 R ACL (patellar graft-Voos)        Pain:   5/10 R knee    Home [place] step to enter.  1 floor home. Tub shower for bathing. Lives with spouse.       Objective   Knee Musculoskeletal Exam  Gait    Antalgic: right    Assistive device: crutches    Inspection    Right      Edema: mild        Edema comment: infra and suprapatellar      Incision: clean, dry, intact and erythema      Incisional drainage: none    Palpation    Right      Tenderness: present          Patella: mild          Patellar tendon: mild          Quadriceps tendon: mild      Range of Motion    Right      Active extension: -5      Active flexion: 30      Passive flexion: 30    Strength    Strength additional comments: R knee not formally tested d/t acuity of surgery.  Able to perform and hold isometric quad set on R  Able to perform SLR 0-30 deg with minimal quad lag       Outcome Measures:  Other Measures  Lower Extremity Funtional Score (LEFS): 0/80     Treatments:  Quad set 5 sec hold x10  Glute set x10  SLR x5  Ankle pumps x10    -Manual:  R patellar mobs in all planes to tolerance     EDUCATION:  Outpatient Education  Individual(s) Educated: Patient  Education Provided: Anatomy, Body Mechanics, Fall Risk, Home Exercise Program, Home Safety, Physiology, POC, Post-Op Precautions, Signs/Symptoms of Infection    Goals:  Patient will improve R knee strength to >/=4+/5 for improved knee stability.    Patient will improve R hip strength to >/=4+/5 for improved knee stability.    Patient will demonstrate R knee dynamometer strength >/=95% of contralateral limb strength.     Patient will improve R knee AROM to >/=0-130 deg for improved knee mobility.    Patient will be independent with HEP.    Patient will demonstrate reciprocal gait pattern without assistive device.    Patient will demonstrate reciprocal step stair negotiation  pattern.    Patient will improve LEFS score to >/=75/80

## 2024-08-27 ENCOUNTER — TREATMENT (OUTPATIENT)
Dept: PHYSICAL THERAPY | Facility: CLINIC | Age: 44
End: 2024-08-27
Payer: COMMERCIAL

## 2024-08-27 DIAGNOSIS — M25.561 RIGHT KNEE PAIN: Primary | ICD-10-CM

## 2024-08-27 DIAGNOSIS — S83.511A DISRUPTION OF ANTERIOR CRUCIATE LIGAMENT OF KNEE, RIGHT, INITIAL ENCOUNTER: ICD-10-CM

## 2024-08-27 DIAGNOSIS — S83.511D DISRUPTION OF ANTERIOR CRUCIATE LIGAMENT OF RIGHT KNEE, SUBSEQUENT ENCOUNTER: ICD-10-CM

## 2024-08-27 PROCEDURE — 97110 THERAPEUTIC EXERCISES: CPT | Mod: GP

## 2024-08-27 NOTE — PROGRESS NOTES
Physical Therapy    Physical Therapy Treatment    Patient Name: Lorenzo Zhu  MRN: [id number]  Today's Date: 8/27/2024    Time Entry:   Time Calculation  Start Time: 1645  Stop Time: 1730  Time Calculation (min): 45 min     PT Therapeutic Procedures Time Entry  Therapeutic Exercise Time Entry: 45                 Visit #2  50 COPAY, 500 DED (MET) 2250 OOP MAX (MET)   40 VS SONALI YR PT/OT, NO AUTH   Med [place]     Assessment:  Pt tolerated intro of NMES to improve quad contractility well with no adverse reactions or pain increase. Introduced standing weight shifts and ambulation with WB on surgical limb with brace and crutches which patient tolerated well without pain or discomfort. Patient did display increased medial knee edema/ecchymosis upon changes of dressing. Instructed to monitor his swelling and edema and to discuss at upcoming MD appointment on 8/30/24 and to contact MD office immediately if symptoms worsen. PT will notify Remi Torres PA-C regarding today's findings.      Plan:   OP PT Plan  Treatment/Interventions: Dry needling, Education/ Instruction, Gait training, Manual therapy, Neuromuscular re-education, Self care/ home management, Taping techniques, Therapeutic activities, Therapeutic exercises, Vasopneumatic device  PT Plan: Skilled PT  PT Frequency: 2 times per week  Duration: 20  Certification Period Start Date: 08/23/24  Certification Period End Date: 11/21/24  Number of Treatments Authorized: 50  Rehab Potential: Good  Plan of Care Agreement: Patient    Current Problem  1. Right knee pain        2. Disruption of anterior cruciate ligament of knee, right, initial encounter                  Subjective    Patient reports some improvement in knee pain. Still wearing immobilizer and using crutches but has initiated some partial WB with walking around home.    Precautions   8/20/24 R ACL (patellar graft-Voos)      Pain   4/10 R knee    Objective   Knee Musculoskeletal Exam    Inspection    Right       Effusion: mild        Edema: mild        Edema comment: medial, anteior knee      Ecchymosis: medium        Ecchymosis comment: Medial knee   Range of Motion    Right      Active extension: 0      Active flexion: 60      Passive flexion: 70     Wells Criteria for DVT  Paralysis, paresis or recent orthopedic casting of lower extremity (1 point)    Recently bedridden (more than 3 days) or major surgery within past 4 weeks (1 point)    Localized tenderness in deep vein system (1 point)    Swelling of entire leg (1 point)    Calf swelling 3 cm greater than other leg (measured 10 cm below the tibial tuberosity) (1 point)    Pitting edema greater in the symptomatic leg (1 point)    Collateral non varicose superficial veins (1 point)    Active cancer or cancer treated within 6 months (1 point)    Alternative diagnosis more likely than DVT (Baker's cyst, cellulitis, muscle damage, superficial venous thrombosis, post phlebitic syndrome, inguinal lymphadenopathy, external venous compression) (-2 points)    Total Criteria Point Count:   1    3 to 8 Points: High probability of DVT  1 to 2 Points: Moderate probability  -2 to 0 Points: Low Probability         Treatments:  Quad set with heel prop 5 sec hold 3x10  Quad set quad NMES 4x10   SLR+quad quad NMES 3x10  Supine Heel slides with strap assist 4x10  Standing lateral weight shifts with brace on and bilat crutches x30 ea  Ambulation with bilat crutches 4-pt gait pattern 1500 ft       OP EDUCATION:   Outpatient Education  Individual(s) Educated: Patient  Education Provided: Anatomy, Body Mechanics, Fall Risk, Home Exercise Program, Home Safety, Physiology, POC, Post-Op Precautions, Signs/Symptoms of Infection    Goals:  Patient will improve R knee strength to >/=4+/5 for improved knee stability.     Patient will improve R hip strength to >/=4+/5 for improved knee stability.     Patient will demonstrate R knee dynamometer strength >/=95% of contralateral limb strength.       Patient will improve R knee AROM to >/=0-130 deg for improved knee mobility.     Patient will be independent with HEP.     Patient will demonstrate reciprocal gait pattern without assistive device.     Patient will demonstrate reciprocal step stair negotiation pattern.     Patient will improve LEFS score to >/=75/80

## 2024-08-28 RX ORDER — ASPIRIN 325 MG
325 TABLET, DELAYED RELEASE (ENTERIC COATED) ORAL DAILY
Qty: 90 TABLET | Refills: 1 | OUTPATIENT
Start: 2024-08-28

## 2024-08-30 ENCOUNTER — OFFICE VISIT (OUTPATIENT)
Dept: ORTHOPEDIC SURGERY | Facility: CLINIC | Age: 44
End: 2024-08-30
Payer: COMMERCIAL

## 2024-08-30 ENCOUNTER — TREATMENT (OUTPATIENT)
Dept: PHYSICAL THERAPY | Facility: CLINIC | Age: 44
End: 2024-08-30
Payer: COMMERCIAL

## 2024-08-30 DIAGNOSIS — M25.561 RIGHT KNEE PAIN: ICD-10-CM

## 2024-08-30 DIAGNOSIS — S83.511D DISRUPTION OF ANTERIOR CRUCIATE LIGAMENT OF KNEE, RIGHT, SUBSEQUENT ENCOUNTER: Primary | ICD-10-CM

## 2024-08-30 DIAGNOSIS — S83.511A DISRUPTION OF ANTERIOR CRUCIATE LIGAMENT OF KNEE, RIGHT, INITIAL ENCOUNTER: ICD-10-CM

## 2024-08-30 PROCEDURE — 99211 OFF/OP EST MAY X REQ PHY/QHP: CPT | Performed by: PHYSICIAN ASSISTANT

## 2024-08-30 PROCEDURE — 1036F TOBACCO NON-USER: CPT | Performed by: PHYSICIAN ASSISTANT

## 2024-08-30 PROCEDURE — 97110 THERAPEUTIC EXERCISES: CPT | Mod: GP

## 2024-08-30 NOTE — PROGRESS NOTES
Physical Therapy    Physical Therapy Treatment    Patient Name: Lorenzo Zhu  MRN: [id number]  Today's Date: 8/30/2024    Time Entry:   Time Calculation  Start Time: 1300  Stop Time: 1345  Time Calculation (min): 45 min     PT Therapeutic Procedures Time Entry  Therapeutic Exercise Time Entry: 45                 Visit #3  50 COPAY, 500 DED (MET) 2250 OOP MAX (MET)   40 VS SONALI YR PT/OT, NO AUTH   Med [place]     Assessment:  Pt continues to progress well with knee ROM, quad contraction and knee stability. Introduced standing hamstring curls and hip abductions along with LAQ which patient tolerated well. Will continue to progress patient as tolerated and appropriate per protocol.       Plan:   OP PT Plan  Treatment/Interventions: Dry needling, Education/ Instruction, Gait training, Manual therapy, Neuromuscular re-education, Self care/ home management, Taping techniques, Therapeutic activities, Therapeutic exercises, Vasopneumatic device  PT Plan: Skilled PT  PT Frequency: 2 times per week  Duration: 20  Certification Period Start Date: 08/23/24  Certification Period End Date: 11/21/24  Number of Treatments Authorized: 50  Rehab Potential: Good  Plan of Care Agreement: Patient    Current Problem  1. Disruption of anterior cruciate ligament of knee, right, initial encounter  Follow Up In Physical Therapy      2. Right knee pain  Follow Up In Physical Therapy                Subjective    Patient follow-up with PA went well. Reassured regarding swelling/ecchymosis on medial knee. Reports some improvement in pain and quad contractility.    Precautions   8/20/24 R ACL (patellar graft-Voos)      Pain   3/10 R medial knee    Objective   Knee Musculoskeletal Exam    Inspection    Right      Effusion: mild        Edema: mild        Edema comment: medial, anteior knee      Ecchymosis: medium        Ecchymosis comment: Medial knee   Range of Motion    Right      Active extension: 0      Active flexion: 87      Passive flexion:  90       Treatments:  Quad set with heel prop 5 sec hold 3x10  Quad set quad NMES 4x10   SLR+quad quad NMES 3x10  Seated Heel slides with furniture slider assist 4x10  Standing lateral weight shifts in parallel bars x30 ea  Standing Hamstring curls 2x15  Standing Marches R LE only x20  Standing R hip abd 2x15  STS from chair (R LE advanced ahead of L) x10  Ambulation with bilat crutches 4-pt gait pattern 1500 ft       OP EDUCATION:   Outpatient Education  Individual(s) Educated: Patient  Education Provided: Anatomy, Body Mechanics, Fall Risk, Home Exercise Program, Home Safety, Physiology, POC, Post-Op Precautions, Signs/Symptoms of Infection    Goals:  Patient will improve R knee strength to >/=4+/5 for improved knee stability.     Patient will improve R hip strength to >/=4+/5 for improved knee stability.     Patient will demonstrate R knee dynamometer strength >/=95% of contralateral limb strength.      Patient will improve R knee AROM to >/=0-130 deg for improved knee mobility.     Patient will be independent with HEP.     Patient will demonstrate reciprocal gait pattern without assistive device.     Patient will demonstrate reciprocal step stair negotiation pattern.     Patient will improve LEFS score to >/=75/80

## 2024-08-30 NOTE — PROGRESS NOTES
Procedure: Right knee ACL reconstruction with patella tendon autograft  Date of procedure: 8/20/2024    HPI:   Patient is status post 10 days right knee anterior cruciate ligament reconstruction with patella tendon autograft. Physical therapy has started. Patient reports no adverse events. Pain is well-controlled.  He is using aspirin for DVT prophylaxis and are compliant with use of the brace and weightbearing restrictions. Fever, chills, shortness of breath, or cough are denied.    ROS  Constitutional: No fever, no chills, not feeling tired, no recent weight gain and no recent weight loss  ENT: No nosebleeds  Cardiovascular: No chest pain  Respiratory: No shortness of breath and no cough  Gastrointestinal: No abdominal pain, no nausea, no diarrhea, and no vomiting  Musculoskeletal: No arthralgias  Integumentary: No rashes and no skin lesions  Neurological: No headache  Psychiatric: No sleep disturbances no depression  Endocrine: No muscle weakness and no muscle cramps  Hematologic/lymphatic: No swelling glands and no tendency for easy bruising    Past Medical History:   Diagnosis Date    Encounter for other general counseling and advice on contraception 02/19/2019    Vasectomy evaluation    Other specified health status     No pertinent past medical history    Personal history of other specified conditions 09/21/2017    History of nausea        Past Surgical History:   Procedure Laterality Date    APPENDECTOMY      OTHER SURGICAL HISTORY  02/19/2019    Cyst excision    VASECTOMY  2021          Current Outpatient Medications:     aspirin 325 mg EC tablet, Take 1 tablet (325 mg) by mouth once daily. Do not fill before August 21, 2024., Disp: 15 tablet, Rfl: 0    docusate sodium (Colace) 100 mg capsule, Take 1 capsule (100 mg) by mouth 2 times a day for 15 days., Disp: 30 capsule, Rfl: 0    ketoconazole (NIZOral) 2 % cream, Apply sparingly to affected area twice daily (Patient not taking: Reported on 8/6/2024), Disp:  30 g, Rfl: 0    multivitamin tablet, Take 1 tablet by mouth once daily., Disp: , Rfl:     ondansetron (Zofran) 4 mg tablet, Take 1 tablet (4 mg) by mouth every 8 hours if needed for nausea or vomiting., Disp: 20 tablet, Rfl: 0    oxyCODONE-acetaminophen (Percocet) 5-325 mg tablet, Take 1 tablet by mouth every 4 hours if needed for severe pain (7 - 10)., Disp: 25 tablet, Rfl: 0    tirzepatide, weight loss, (Zepbound) 5 mg/0.5 mL injection, Inject 5 mg under the skin every 7 days., Disp: 4 each, Rfl: 2     No Known Allergies     Social Connections: Not on file         Physical exam:  43-year-old male well appearing in no acute distress. Alert and oriented ×3.  Skin intact bilateral lower extremities.   Using crutches. Coordination and balance intact.  Bilateral lower extremity compartments supple.  5 out of 5 distal motor strength bilaterally.  L4 through S1 sensation intact bilaterally.  2+ DP/PT pulses bilaterally.  Right knee incisions are clean without signs of infection. No erythema or drainage. Sutures removed and Steri-Strips placed over the incisions.  Mild to moderate ecchymosis over the medial aspect of his knee with some down the lower leg.  There is mild quad atrophy. Patient can perform an isometric quad contraction and a straight leg raise. Active range of motion from 0 to 40 degrees. Negative Lachman's. Negative Homans.    Diagnosis:  Right knee ACL disruption    Plan:  1. Incisions may get wet but avoid submersing them in water.  2. Continue frequent icing.  3. Continue outpatient physical therapy as per the protocol.  He can be weightbearing as tolerated and begin to wean off crutches and walk without them as soon as he is able to walk comfortably without pain or limp  4. Appropriate activity and restrictions were discussed.  5. Over-the-counter analgesia as may be used as necessary.  6.  I would prefer for him to use compression stockings for DVT prophylaxis.  He should use these for the next 2  weeks.  6. Follow up again in [ ]  weeks.    This office note was dictated using Dragon voice to text software and was not proofread for spelling or grammatical errors

## 2024-09-03 ENCOUNTER — TREATMENT (OUTPATIENT)
Dept: PHYSICAL THERAPY | Facility: CLINIC | Age: 44
End: 2024-09-03
Payer: COMMERCIAL

## 2024-09-03 DIAGNOSIS — S83.511A DISRUPTION OF ANTERIOR CRUCIATE LIGAMENT OF KNEE, RIGHT, INITIAL ENCOUNTER: ICD-10-CM

## 2024-09-03 DIAGNOSIS — M25.561 RIGHT KNEE PAIN: Primary | ICD-10-CM

## 2024-09-03 PROCEDURE — 97140 MANUAL THERAPY 1/> REGIONS: CPT | Mod: GP

## 2024-09-03 PROCEDURE — 97110 THERAPEUTIC EXERCISES: CPT | Mod: GP

## 2024-09-03 NOTE — PROGRESS NOTES
Physical Therapy    Physical Therapy Treatment    Patient Name: Lorenzo Zhu  MRN: [id number]  Today's Date: 9/3/2024    Time Entry:   Time Calculation  Start Time: 1517  Stop Time: 1557  Time Calculation (min): 40 min     PT Therapeutic Procedures Time Entry  Manual Therapy Time Entry: 12  Therapeutic Exercise Time Entry: 28                 Visit #4  50 COPAY, 500 DED (MET) 2250 OOP MAX (MET)   40 VS SONALI YR PT/OT, NO AUTH   Med [place]     Assessment:  Pt continues to progress appropriately with knee ROM, mm contractility and ambulatory capacity/stability. Introduced light resistance with quad exercises with NMES which patient performed without pain. Showing progressively improving knee flexion/extension AROM.      Plan:   OP PT Plan  Treatment/Interventions: Dry needling, Education/ Instruction, Gait training, Manual therapy, Neuromuscular re-education, Self care/ home management, Taping techniques, Therapeutic activities, Therapeutic exercises, Vasopneumatic device  PT Plan: Skilled PT  PT Frequency: 2 times per week  Duration: 20  Certification Period Start Date: 08/23/24  Certification Period End Date: 11/21/24  Number of Treatments Authorized: 50  Rehab Potential: Good  Plan of Care Agreement: Patient    Current Problem  1. Right knee pain        2. Disruption of anterior cruciate ligament of knee, right, initial encounter                    Subjective    Patient feels his swelling and bruising are both improving. Has started to ambulate without crutches around home, still wearing immobilizer.    Precautions   8/20/24 R ACL (patellar graft-Voos)      Pain   2/10 R medial knee    Objective   Knee Musculoskeletal Exam    Inspection    Right      Effusion: mild        Edema: mild        Edema comment: medial, anteior knee      Ecchymosis: medium        Ecchymosis comment: Medial knee   Range of Motion    Right      Active extension: 0      Active flexion: 90      Passive flexion: 100       Treatments:  Quad set  with heel prop 5 sec hold 3x10  SLR+quad quad NMES 3x10 2#  LAQ+quad NMES 3x10 2#  Long sitting Heel slides with pillowcase assist 4x10  Standing lateral weight shifts in parallel bars x30 ea  Standing Hamstring curls 2x15  Retro reciprocal stepping (emphasis on R knee ext) 2x30  Isometric hamstring curl in sitting vs. PT resistance 5 sec hold 2x10    -Manual Therapy-  Join Mobilization:  R tibiofemoral jt AP, PA  Grade: II  PROM knee flexion with gentle OP at end-range  Soft tissue mobilization to: R quad     Appropriate force, direction, amplitude, & velocity for selected techniques to improve joint & soft tissue mobility & enhance ADL performance. Rationale & procedure given for above treatment techniques, & verbal consent was obtained prior to initiating treatment.      OP EDUCATION:   Outpatient Education  Individual(s) Educated: Patient  Education Provided: Anatomy, Body Mechanics, Fall Risk, Home Exercise Program, Home Safety, Physiology, POC, Post-Op Precautions, Signs/Symptoms of Infection    Goals:  Patient will improve R knee strength to >/=4+/5 for improved knee stability.     Patient will improve R hip strength to >/=4+/5 for improved knee stability.     Patient will demonstrate R knee dynamometer strength >/=95% of contralateral limb strength.      Patient will improve R knee AROM to >/=0-130 deg for improved knee mobility.     Patient will be independent with HEP.     Patient will demonstrate reciprocal gait pattern without assistive device.     Patient will demonstrate reciprocal step stair negotiation pattern.     Patient will improve LEFS score to >/=75/80

## 2024-09-06 ENCOUNTER — TREATMENT (OUTPATIENT)
Dept: PHYSICAL THERAPY | Facility: CLINIC | Age: 44
End: 2024-09-06
Payer: COMMERCIAL

## 2024-09-06 DIAGNOSIS — M25.561 RIGHT KNEE PAIN: ICD-10-CM

## 2024-09-06 DIAGNOSIS — S83.511A DISRUPTION OF ANTERIOR CRUCIATE LIGAMENT OF KNEE, RIGHT, INITIAL ENCOUNTER: Primary | ICD-10-CM

## 2024-09-06 PROCEDURE — 97140 MANUAL THERAPY 1/> REGIONS: CPT | Mod: GP

## 2024-09-06 PROCEDURE — 97110 THERAPEUTIC EXERCISES: CPT | Mod: GP

## 2024-09-06 NOTE — PROGRESS NOTES
Physical Therapy    Physical Therapy Treatment    Patient Name: Lorenzo Zhu  MRN: [id number]  Today's Date: 9/6/2024    Time Entry:   Time Calculation  Start Time: 1503  Stop Time: 1543  Time Calculation (min): 40 min     PT Therapeutic Procedures Time Entry  Manual Therapy Time Entry: 12  Therapeutic Exercise Time Entry: 28                 Visit #5  50 COPAY, 500 DED (MET) 2250 OOP MAX (MET)   40 VS SONALI YR PT/OT, NO AUTH   Med [place]     Assessment:  Pt continues to progress well. Improving quad and hamstring contractions with and without resistance. Able to achieve end-range TKE with less difficulty this session. Will continue progressions as tolerated and appropriate per protocol.      Plan:   OP PT Plan  Treatment/Interventions: Dry needling, Education/ Instruction, Gait training, Manual therapy, Neuromuscular re-education, Self care/ home management, Taping techniques, Therapeutic activities, Therapeutic exercises, Vasopneumatic device  PT Plan: Skilled PT  PT Frequency: 2 times per week  Duration: 20  Certification Period Start Date: 08/23/24  Certification Period End Date: 11/21/24  Number of Treatments Authorized: 50  Rehab Potential: Good  Plan of Care Agreement: Patient    Current Problem  1. Disruption of anterior cruciate ligament of knee, right, initial encounter        2. Right knee pain                      Subjective    No new complaints. Doing well with HEP and ambulation.     Precautions   8/20/24 R ACL (patellar graft-Voos)      Pain   1/10 R medial knee    Objective   Knee Musculoskeletal Exam    Inspection    Right      Effusion: mild        Edema: mild        Edema comment: medial, anteior knee      Ecchymosis: medium        Ecchymosis comment: Medial knee   Range of Motion    Right      Active extension: 0      Active flexion: 105      Passive flexion: 106       Treatments:  Quad set with heel prop 5 sec hold 3x10  SLR+quad quad NMES 3x10 2#  LAQ+quad NMES 3x10 2#  Long sitting Heel slides  with pillowcase assist 4x10  Standing lateral weight shifts in parallel bars x30 ea  Standing Hamstring curls 2x15  Retro reciprocal stepping (emphasis on R knee ext) 2x30  Isometric hamstring curl in sitting vs. PB 5 sec hold 2x10  Seated TKE vs. PB+NMES 4x10    -Manual Therapy-  Join Mobilization:  R tibiofemoral jt AP, PA  Grade: II  PROM knee flexion with gentle OP at end-range  Soft tissue mobilization to: R quad     Appropriate force, direction, amplitude, & velocity for selected techniques to improve joint & soft tissue mobility & enhance ADL performance. Rationale & procedure given for above treatment techniques, & verbal consent was obtained prior to initiating treatment.      OP EDUCATION:   Outpatient Education  Individual(s) Educated: Patient  Education Provided: Anatomy, Body Mechanics, Fall Risk, Home Exercise Program, Home Safety, Physiology, POC, Post-Op Precautions, Signs/Symptoms of Infection    Goals:  Patient will improve R knee strength to >/=4+/5 for improved knee stability.     Patient will improve R hip strength to >/=4+/5 for improved knee stability.     Patient will demonstrate R knee dynamometer strength >/=95% of contralateral limb strength.      Patient will improve R knee AROM to >/=0-130 deg for improved knee mobility.     Patient will be independent with HEP.     Patient will demonstrate reciprocal gait pattern without assistive device.     Patient will demonstrate reciprocal step stair negotiation pattern.     Patient will improve LEFS score to >/=75/80

## 2024-09-11 ENCOUNTER — TREATMENT (OUTPATIENT)
Dept: PHYSICAL THERAPY | Facility: CLINIC | Age: 44
End: 2024-09-11
Payer: COMMERCIAL

## 2024-09-11 DIAGNOSIS — S83.511D DISRUPTION OF ANTERIOR CRUCIATE LIGAMENT OF KNEE, RIGHT, SUBSEQUENT ENCOUNTER: ICD-10-CM

## 2024-09-11 DIAGNOSIS — M25.561 RIGHT KNEE PAIN, UNSPECIFIED CHRONICITY: Primary | ICD-10-CM

## 2024-09-11 PROCEDURE — 97110 THERAPEUTIC EXERCISES: CPT | Mod: GP | Performed by: PHYSICAL THERAPIST

## 2024-09-11 PROCEDURE — 97112 NEUROMUSCULAR REEDUCATION: CPT | Mod: GP | Performed by: PHYSICAL THERAPIST

## 2024-09-11 PROCEDURE — 97140 MANUAL THERAPY 1/> REGIONS: CPT | Mod: GP | Performed by: PHYSICAL THERAPIST

## 2024-09-11 ASSESSMENT — PAIN SCALES - GENERAL: PAINLEVEL_OUTOF10: 2

## 2024-09-11 ASSESSMENT — PAIN - FUNCTIONAL ASSESSMENT: PAIN_FUNCTIONAL_ASSESSMENT: 0-10

## 2024-09-13 NOTE — PROGRESS NOTES
Physical Therapy Treatment      Patient Name: Lorenzo Zhu  MRN: [id number]  Today's Date: 9/11/2024  Visit #6  Time Calculation  Start Time: 1630  Stop Time: 1730  Time Calculation (min): 60 min    Insurance:  Visit Limit: 40  Date Range: No auth  Co-Pay: $50    Assessment:  Today's session focused on strength, ROM, neuromuscular control, endurance, joint mobility, soft tissue mobilization, flexibility, and gait. Patient demonstrated good tolerance to session this date. They are demonstrating good progress in skilled rehabilitation at this time, though they are still limited by decreased muscle performance, decreased ROM, decreased activity tolerance, pain, participation restrictions, impaired balance/gait, and difficulty with ADL completion. Patient continues to be a good candidate for skilled PT in order to further address listed impairments. Updated HEP to reflect today's session. All questions answered.    Patient's response to session: Decreased pain, Increased ROM/joint mobility, Increase motor control, and Increased knowledge and understanding    Plan:  Continue per POC.    Current Problem:   1. Right knee pain, unspecified chronicity        2. Disruption of anterior cruciate ligament of knee, right, subsequent encounter            Subjective   Patient reports he is doing better overall. Has been working on HEP. Swelling and bruising is improving. He is 3.1 weeks s/p R ACLR with BPTB autograft on 8/20/2024.    Pain Assessment: 0-10  0-10 (Numeric) Pain Score: 2    Precautions  Precautions Comment: No running/jumping/cutting    Objective   Hip ROM (L/R)  Flexion: 125°/125°  Abduction: 45°/45°  Extension: 10°/10°  External Rotation: 45°/45°  Internal rotation: 45°/45°    Knee ROM (L/R)  Extension: 0°/0°  Flexion: 140°/120°    Joint mobility testing (normal unless otherwise noted below)  Proximal Tibia-Fibular Joint:  PFJ: hypo    Gait: antalgic, improving    Treatments:  Therapeutic Exercise (32103):  15  minutes  HEP review  SLR  Bridges  LAQ  Side steps, green TB*  BW squats*    Manual Therapy (38859):  12 minutes  Knee ext OP, short and long axis  Patellar mobilizations    Neuromuscular Re-education (53511):  28 minutes  BFR at 80% LAQ, 2.5#  BFR at 80% BW squats  Ambulation with improved mechanics    Education and discussion on HEP and treatment regarding the benefits related to current condition, POC, pathophysiology, and precautions    *added to HEP

## 2024-09-16 ENCOUNTER — OFFICE VISIT (OUTPATIENT)
Dept: ORTHOPEDIC SURGERY | Facility: CLINIC | Age: 44
End: 2024-09-16
Payer: COMMERCIAL

## 2024-09-16 DIAGNOSIS — S83.511D DISRUPTION OF ANTERIOR CRUCIATE LIGAMENT OF KNEE, RIGHT, SUBSEQUENT ENCOUNTER: Primary | ICD-10-CM

## 2024-09-16 PROCEDURE — 1036F TOBACCO NON-USER: CPT | Performed by: PHYSICIAN ASSISTANT

## 2024-09-16 PROCEDURE — 99211 OFF/OP EST MAY X REQ PHY/QHP: CPT | Performed by: PHYSICIAN ASSISTANT

## 2024-09-16 ASSESSMENT — PAIN - FUNCTIONAL ASSESSMENT: PAIN_FUNCTIONAL_ASSESSMENT: 0-10

## 2024-09-16 ASSESSMENT — PAIN SCALES - GENERAL: PAINLEVEL_OUTOF10: 0 - NO PAIN

## 2024-09-16 NOTE — PROGRESS NOTES
Procedure: Right knee ACL reconstruction with patella tendon autograft  Date of procedure: 8/20/2024    HPI:   Patient is status post 4 weeks right anterior cruciate ligament reconstruction with [default]. No adverse events are reported. They continue with physical therapy. Pain is minimal. They deny any instability. Physical therapy is progressing.  He took himself out of the brace.    ROS  Constitutional: No fever, no chills, not feeling tired, no recent weight gain and no recent weight loss  ENT: No nosebleeds  Cardiovascular: No chest pain  Respiratory: No shortness of breath and no cough  Gastrointestinal: No abdominal pain, no nausea, no diarrhea, and no vomiting  Musculoskeletal: No arthralgias  Integumentary: No rashes and no skin lesions  Neurological: No headache  Psychiatric: No sleep disturbances no depression  Endocrine: No muscle weakness and no muscle cramps  Hematologic/lymphatic: No swelling glands and no tendency for easy bruising    Past Medical History:   Diagnosis Date    Encounter for other general counseling and advice on contraception 02/19/2019    Vasectomy evaluation    Other specified health status     No pertinent past medical history    Personal history of other specified conditions 09/21/2017    History of nausea        Past Surgical History:   Procedure Laterality Date    ANTERIOR CRUCIATE LIGAMENT REPAIR Right 08/20/2024    Right knee ACL reconstruction with patella tendon autograft    APPENDECTOMY      OTHER SURGICAL HISTORY  02/19/2019    Cyst excision    VASECTOMY  2021          Current Outpatient Medications:     ketoconazole (NIZOral) 2 % cream, Apply sparingly to affected area twice daily (Patient not taking: Reported on 8/6/2024), Disp: 30 g, Rfl: 0    multivitamin tablet, Take 1 tablet by mouth once daily., Disp: , Rfl:     tirzepatide, weight loss, (Zepbound) 5 mg/0.5 mL injection, Inject 5 mg under the skin every 7 days., Disp: 4 each, Rfl: 2     No Known Allergies      Social Connections: Not on file         Physical exam:  43-year-old male well appearing in no acute distress. Alert and oriented ×3.  Skin intact bilateral lower extremities.   Normal tandem gait. Coordination and balance intact.  Bilateral lower extremity compartments supple.  5 out of 5 distal motor strength bilaterally.  L4 through S1 sensation intact bilaterally.  2+ DP/PT pulses bilaterally.  Right incisions are healing nicely. There is improved quad tone.  Minimal swelling. Patient can perform an isometric quad contraction and straight leg raise. Active range of motion from 2 to 110 degrees. Negative Lachman's. Negative Homans.    Diagnosis:  Right knee ACL disruption    Plan:  1.  We discussed the importance of continue to focus on extension and resting at home with something underneath his heel.  2. Continue with outpatient physical therapy and home exercise program.  3. Continue frequent icing and use over-the-counter analgesics for pain and discomfort  4. Appropriate activity and restrictions were reviewed  5. Follow up again in 4 weeks    This office note was dictated using Dragon voice to text software and was not proofread for spelling or grammatical errors

## 2024-09-18 ENCOUNTER — TREATMENT (OUTPATIENT)
Dept: PHYSICAL THERAPY | Facility: CLINIC | Age: 44
End: 2024-09-18
Payer: COMMERCIAL

## 2024-09-18 DIAGNOSIS — M25.561 RIGHT KNEE PAIN, UNSPECIFIED CHRONICITY: Primary | ICD-10-CM

## 2024-09-18 DIAGNOSIS — S83.511D DISRUPTION OF ANTERIOR CRUCIATE LIGAMENT OF KNEE, RIGHT, SUBSEQUENT ENCOUNTER: ICD-10-CM

## 2024-09-18 PROCEDURE — 97110 THERAPEUTIC EXERCISES: CPT | Mod: GP | Performed by: PHYSICAL THERAPIST

## 2024-09-18 PROCEDURE — 97140 MANUAL THERAPY 1/> REGIONS: CPT | Mod: GP | Performed by: PHYSICAL THERAPIST

## 2024-09-18 PROCEDURE — 97112 NEUROMUSCULAR REEDUCATION: CPT | Mod: GP | Performed by: PHYSICAL THERAPIST

## 2024-09-18 ASSESSMENT — PAIN - FUNCTIONAL ASSESSMENT: PAIN_FUNCTIONAL_ASSESSMENT: 0-10

## 2024-09-18 ASSESSMENT — PAIN SCALES - GENERAL: PAINLEVEL_OUTOF10: 1

## 2024-09-18 NOTE — PROGRESS NOTES
Physical Therapy Treatment      Patient Name: Lorenzo Zhu  MRN: [id number]  Today's Date: 9/18/2024  Visit #7  Time Calculation  Start Time: 1630  Stop Time: 1730  Time Calculation (min): 60 min    Insurance:  Visit Limit: 40  Date Range: No auth  Co-Pay: $50    Assessment:  Full ext ROM achieved today. Today's session focused on strength, ROM, neuromuscular control, endurance, joint mobility, soft tissue mobilization, flexibility, and gait. Patient demonstrated good tolerance to session this date. They are demonstrating good progress in skilled rehabilitation at this time, though they are still limited by decreased muscle performance, decreased ROM, decreased activity tolerance, pain, participation restrictions, impaired balance/gait, and difficulty with ADL completion. Patient continues to be a good candidate for skilled PT in order to further address listed impairments. Updated HEP to reflect today's session. All questions answered.    Patient's response to session: Decreased pain, Increased ROM/joint mobility, Increase motor control, and Increased knowledge and understanding    Plan:  Continue per POC.    Current Problem:   1. Right knee pain, unspecified chronicity        2. Disruption of anterior cruciate ligament of knee, right, subsequent encounter            Subjective   Patient reports he is doing well. Has been working on HEP. Followed up with Remi Torres PA-C yesterday. He is 4.1 weeks s/p R ACLR with BPTB autograft on 8/20/2024.    Pain Assessment: 0-10  0-10 (Numeric) Pain Score: 1    Precautions  Precautions Comment: No running/jumping/cutting    Objective   Hip ROM (L/R)  Flexion: 125°/125°  Abduction: 45°/45°  Extension: 10°/10°  External Rotation: 45°/45°  Internal rotation: 45°/45°    Knee ROM (L/R)  Extension: 0°/0°  Flexion: 140°/124°    Joint mobility testing (normal unless otherwise noted below)  Proximal Tibia-Fibular Joint:  PFJ: hypo    Gait: reduced stance time on  "R    Treatments:  Therapeutic Exercise (18483): 20 minutes  HEP review  Knee ext hang with focal joint cooling  SLR  Knee AROM  Knee ext self-OP*  Split squats*  PB HS curl    Manual Therapy (93278): 10 minutes  Knee ext OP, short and long axis  Patellar mobilizations    Neuromuscular Re-education (05535): 24 minutes  BFR at 80% LAQ, 5#  BFR at 80% lateral step down, 4\"  Ambulation with improved mechanics    Education and discussion on HEP and treatment regarding the benefits related to current condition, POC, pathophysiology, and precautions    *added to HEP  "

## 2024-09-27 ENCOUNTER — TREATMENT (OUTPATIENT)
Dept: PHYSICAL THERAPY | Facility: CLINIC | Age: 44
End: 2024-09-27
Payer: COMMERCIAL

## 2024-09-27 DIAGNOSIS — M25.561 RIGHT KNEE PAIN, UNSPECIFIED CHRONICITY: Primary | ICD-10-CM

## 2024-09-27 DIAGNOSIS — S83.511D DISRUPTION OF ANTERIOR CRUCIATE LIGAMENT OF KNEE, RIGHT, SUBSEQUENT ENCOUNTER: ICD-10-CM

## 2024-09-27 PROCEDURE — 97110 THERAPEUTIC EXERCISES: CPT | Mod: GP

## 2024-09-27 NOTE — PROGRESS NOTES
Physical Therapy Treatment      Patient Name: Lorenzo Zhu  MRN: [id number]  Today's Date: 9/27/2024  Visit #8  Time Calculation  Start Time: 0832  Stop Time: 0910  Time Calculation (min): 38 min    Insurance:  Visit Limit: 40  Date Range: No auth  Co-Pay: $50    Assessment:  Patient did well this session. Corrective cueing required for limiting hip IR compensation with step-ups with good follow-through by patient. Added marches and wall sits to HEP and encouraged continued HEP consistency.     Patient's response to session: Decreased pain, Increased ROM/joint mobility, Increase motor control, and Increased knowledge and understanding    Plan:  Continue per POC.    Current Problem:   1. Right knee pain, unspecified chronicity        2. Disruption of anterior cruciate ligament of knee, right, subsequent encounter              Subjective   Patient reports he is doing well. Working on achieving end-range extension. Ambulating without device or brace today.            Objective   Hip ROM (L/R)  Flexion: 125°/125°  Abduction: 45°/45°  Extension: 10°/10°  External Rotation: 45°/45°  Internal rotation: 45°/45°    Knee ROM (L/R)  Extension: 0°/0°  Flexion: 140°/124°    Joint mobility testing (normal unless otherwise noted below)  Proximal Tibia-Fibular Joint:  PFJ: hypo    Gait: missing ~4 deg terminal R knee extension in stance phase  Step -ups: R hip IR/adduction compensation when descending    Treatments:  Therapeutic Exercise (86758): 38 minutes  HEP review  Nu-step 6 min (focus on TKE)  Fwd lunge cone taps 3x10  HS stretch on 6 in step 30 sec x5  Side stepping with GTB loop 5x10 ea*  Incline marches with GTB loop 3x10*  Lateral step-ups 4 in step x20  Retro step-ups 4 in step x20  PB wall sits 70 deg flexion 10 sec hold x20*        Education and discussion on HEP and treatment regarding the benefits related to current condition, POC, pathophysiology, and precautions    *added to HEP

## 2024-10-04 ENCOUNTER — TREATMENT (OUTPATIENT)
Dept: PHYSICAL THERAPY | Facility: CLINIC | Age: 44
End: 2024-10-04
Payer: COMMERCIAL

## 2024-10-04 DIAGNOSIS — M25.561 RIGHT KNEE PAIN, UNSPECIFIED CHRONICITY: Primary | ICD-10-CM

## 2024-10-04 DIAGNOSIS — S83.511D DISRUPTION OF ANTERIOR CRUCIATE LIGAMENT OF KNEE, RIGHT, SUBSEQUENT ENCOUNTER: ICD-10-CM

## 2024-10-04 PROCEDURE — 97110 THERAPEUTIC EXERCISES: CPT | Mod: GP

## 2024-10-04 NOTE — PROGRESS NOTES
Physical Therapy Treatment      Patient Name: Lorenzo Zhu  MRN: [id number]  Today's Date: 10/4/2024  Visit #9  Time Calculation  Start Time: 0835  Stop Time: 0918  Time Calculation (min): 43 min    Insurance:  Visit Limit: 40  Date Range: No auth  Co-Pay: $50    Assessment:  Patient appropriately challenged and fatigued with all exercises this session. Introduced SL heel raises, along with furniture slider hip abduction, adduction and retro lunges for improving quad control with multiplane step ups/downs with less hip IR/adduction compensation which patient is showing some improvement with.    Patient's response to session: Decreased pain, Increased ROM/joint mobility, Increase motor control, and Increased knowledge and understanding    Plan:  Continue per POC.    Current Problem:   1. Right knee pain, unspecified chronicity        2. Disruption of anterior cruciate ligament of knee, right, subsequent encounter                Subjective   Patient reports he is doing well. Improving his quad and HS strength with gym leg press, HSC. Denies any pain with walking or ADL performance.            Objective   Hip ROM (L/R)  Flexion: 125°/125°  Abduction: 45°/45°  Extension: 10°/10°  External Rotation: 45°/45°  Internal rotation: 45°/45°    Knee ROM (L/R)  Extension: 0°/0°  Flexion: 140°/124°    Gait: missing ~2 deg terminal R knee extension in stance phase  Step -ups: R hip IR/adduction compensation when descending    Treatments:  Therapeutic Exercise (79866): 43 minutes  HEP review  Nu-step 6 min (focus on TKE)  Lateral step-outs vs.12.5# cable weight  5 laps, 8 recip steps per lap  Fwd/Retro step-outs vs.12.5# cable weight  5 laps, 8 recip steps per lap  Lateral step-ups 4 in step x20  Retro step-ups 4 in step x20  SL Leg press 3x20 70#  SL HR 2x20  Standing hip abd vs. Blue TB with furniture slider 2x10 ea  Standing hip add vs. Blue TB with furniture slider 2x10 ea  Retro lunge vs. BTB 2x15 ea        Education and  discussion on HEP and treatment regarding the benefits related to current condition, POC, pathophysiology, and precautions    *added to HEP

## 2024-10-11 ENCOUNTER — TREATMENT (OUTPATIENT)
Dept: PHYSICAL THERAPY | Facility: CLINIC | Age: 44
End: 2024-10-11
Payer: COMMERCIAL

## 2024-10-11 DIAGNOSIS — M25.561 RIGHT KNEE PAIN, UNSPECIFIED CHRONICITY: Primary | ICD-10-CM

## 2024-10-11 DIAGNOSIS — S83.511D DISRUPTION OF ANTERIOR CRUCIATE LIGAMENT OF KNEE, RIGHT, SUBSEQUENT ENCOUNTER: ICD-10-CM

## 2024-10-11 PROCEDURE — 97110 THERAPEUTIC EXERCISES: CPT | Mod: GP

## 2024-10-11 NOTE — PROGRESS NOTES
Physical Therapy Treatment      Patient Name: Lorenzo Zhu  MRN: [id number]  Today's Date: 10/11/2024  Visit #10  Time Calculation  Start Time: 0835  Stop Time: 0917  Time Calculation (min): 42 min    Insurance:  Visit Limit: 40  Date Range: No auth  Co-Pay: $50    Assessment:  Patient appropriately challenged and fatigued with all exercises this session. Introduced bridges with hamstring curls, Urdu squats, and single leg RDLs. Patient tolerated session well and performed exercises with good form and no increased complaints of pain. Patient improving ROM and functional mobility.     Patient's response to session: Decreased pain, Increased ROM/joint mobility, Increase motor control, and Increased knowledge and understanding    Plan:  Continue per POC.    Current Problem:   1. Right knee pain, unspecified chronicity        2. Disruption of anterior cruciate ligament of knee, right, subsequent encounter                  Subjective   Patient will return to gym program this week and return with updated numbers for exercises. Overall patient is doing well and does not have any issues. Still having some anterior pressure when squatting.           Objective   Hip ROM (L/R)  Flexion: 125°/125°  Abduction: 45°/45°  Extension: 10°/10°  External Rotation: 45°/45°  Internal rotation: 45°/45°    Knee ROM (L/R)  Extension: 0°/0°  Flexion: 140°/135°    Gait: missing ~2 deg terminal R knee extension in stance phase  Step -ups: R hip IR/adduction compensation when descending    Treatments:  Therapeutic Exercise (90614): 40 minutes  HEP review  Recumbent bike; 5 min  Lateral step-ups 4 in step x20  Retro step ups 6 in step - dc'd  Patient demo functional valgus, unable to correct during today's session with cueing  Retro step-ups 4 in step x20  SL Leg press 3x20 70#  Bridge with HS curl; TB; 2x10  Single leg RDL; 2x12  Slovak squats; Blue TB  BFR; LAQ; 5#  BFR; Squats; 8# bilateral  80% occlusion pressure for BFR      Education  and discussion on HEP and treatment regarding the benefits related to current condition, POC, pathophysiology, and precautions    *added to HEP

## 2024-10-15 ENCOUNTER — TREATMENT (OUTPATIENT)
Dept: PHYSICAL THERAPY | Facility: CLINIC | Age: 44
End: 2024-10-15
Payer: COMMERCIAL

## 2024-10-15 DIAGNOSIS — S83.511A DISRUPTION OF ANTERIOR CRUCIATE LIGAMENT OF KNEE, RIGHT, INITIAL ENCOUNTER: ICD-10-CM

## 2024-10-15 DIAGNOSIS — M25.561 RIGHT KNEE PAIN: ICD-10-CM

## 2024-10-15 PROCEDURE — 97110 THERAPEUTIC EXERCISES: CPT | Mod: GP

## 2024-10-15 NOTE — PROGRESS NOTES
Physical Therapy Treatment      Patient Name: Lorenzo Zhu  MRN: [id number]  Today's Date: 10/15/2024  Visit #11  Time Calculation  Start Time: 0904  Stop Time: 0944  Time Calculation (min): 40 min    Insurance:  Visit Limit: 40  Date Range: No auth  Co-Pay: $50    Assessment:  Patient continues to progress well from a knee strength and stability standpoint. Quad strength nearly symmetrical to contralateral limb with dynamometer testing today. Added prone TKE's and isometric fwd lunge to HEP for improvement of knee extension control and eccentric quad activities. Patient progressing well with PT and would continue to benefit.    Patient's response to session: Decreased pain, Increased ROM/joint mobility, Increase motor control, and Increased knowledge and understanding    Plan:  Continue per POC.    Current Problem:   1. Disruption of anterior cruciate ligament of knee, right, initial encounter  Follow Up In Physical Therapy      2. Right knee pain  Follow Up In Physical Therapy                Subjective   Patient doing well overall.  Notes some discomfort with end-range knee extension.           Objective   Hip ROM (L/R)  Flexion: 125°/125°  Abduction: 45°/45°  Extension: 10°/10°  External Rotation: 45°/45°  Internal rotation: 45°/45°    Knee ROM (L/R)  Extension: 0°/0°  Flexion: 140°/135°    Dynamometer Strength testing in sitting:  R knee:  -flexion: 58#  -extension: 68.4#  L knee:  -flexion: 68.2#  -extension: 68.9#      Treatments:  Therapeutic Exercise (78704): 40 minutes  HEP review  Recumbent bike; 5 min  Lateral step-ups 4 in step x20  Retro step ups 4 in step -x20  Improving valgus control with descent  Retro step-ups 4 in step x20  SL Leg press 3x20 90#  Prone TKE 2x20  Fwd lunge isometric hold 10 sec x10  BFR; LAQ; 7#  BFR; Squats;   80% occlusion pressure for BFR      Education and discussion on HEP and treatment regarding the benefits related to current condition, POC, pathophysiology, and  precautions    *added to HEP

## 2024-10-18 ENCOUNTER — APPOINTMENT (OUTPATIENT)
Dept: PHYSICAL THERAPY | Facility: CLINIC | Age: 44
End: 2024-10-18
Payer: COMMERCIAL

## 2024-10-23 ENCOUNTER — OFFICE VISIT (OUTPATIENT)
Dept: ORTHOPEDIC SURGERY | Facility: CLINIC | Age: 44
End: 2024-10-23
Payer: COMMERCIAL

## 2024-10-23 DIAGNOSIS — S83.511D DISRUPTION OF ANTERIOR CRUCIATE LIGAMENT OF KNEE, RIGHT, SUBSEQUENT ENCOUNTER: Primary | ICD-10-CM

## 2024-10-23 PROCEDURE — 1036F TOBACCO NON-USER: CPT | Performed by: PHYSICIAN ASSISTANT

## 2024-10-23 PROCEDURE — 99211 OFF/OP EST MAY X REQ PHY/QHP: CPT | Performed by: PHYSICIAN ASSISTANT

## 2024-10-23 NOTE — PROGRESS NOTES
Subjective    Patient ID: Lorenzo Zhu is a 43 y.o. male.    Procedure: Right knee ACL reconstruction with patella tendon autograft  Date of surgery: 8/20/2024      HPI:  Lorenzo Zhu is a 43 y.o. male who is status post 9 weeks right knee ACL reconstruction with patella tendon autograft.  No problems or adverse events reported.  He reports still having a little bit of numbness just lateral to the harvest site incision.  He states pain is minimal.  He feels that physical therapy is progressing.    ROS  Constitutional: No fever, no chills, not feeling tired, no recent weight gain and no recent weight loss  ENT: No nosebleeds  Cardiovascular: No chest pain  Respiratory: No shortness of breath and no cough  Gastrointestinal: No abdominal pain, no nausea, no diarrhea, and no vomiting  Musculoskeletal: No arthralgias  Integumentary: No rashes and no skin lesions  Neurological: No headache  Psychiatric: No sleep disturbances no depression  Endocrine: No muscle weakness and no muscle cramps  Hematologic/lymphatic: No swelling glands and no tendency for easy bruising      Objective   43-year-old male well appearing in no acute distress. Alert and oriented ×3.  Skin intact bilateral lower extremities.   Normal tandem gait. Coordination and balance intact.  Bilateral lower extremity compartments supple.  5 out of 5 distal motor strength bilaterally.  L4 through S1 sensation intact bilaterally.  2+ DP/PT pulses bilaterally.  Right knee incisions are healing nicely with minimal scarring.  No joint effusion.  He is able to perform an isometric quad contraction and straight leg raise out difficulty.  Active range of motion 0 to 140 degrees.  Negative Lachman's.      Assessment/Plan   Encounter Diagnoses:  Disruption of anterior cruciate ligament of knee, right, subsequent encounter    No orders of the defined types were placed in this encounter.      He is going to continue with physical therapy and progress as per the  protocol.  Appropriate activity and restrictions were reviewed.  We discussed that the numbness typically will improve over time.  Continue frequent icing.  He can start using the exercise bike for cardiovascular work and in 2 weeks progressed the elliptical.  Will see him back in 1 month at which time we will discuss the jogging program and order his functional brace.    This office note was dictated using Dragon voice to text software and was not proofread for spelling or grammatical errors

## 2024-10-25 ENCOUNTER — TREATMENT (OUTPATIENT)
Dept: PHYSICAL THERAPY | Facility: CLINIC | Age: 44
End: 2024-10-25
Payer: COMMERCIAL

## 2024-10-25 DIAGNOSIS — S83.511A DISRUPTION OF ANTERIOR CRUCIATE LIGAMENT OF KNEE, RIGHT, INITIAL ENCOUNTER: ICD-10-CM

## 2024-10-25 DIAGNOSIS — M25.561 RIGHT KNEE PAIN: ICD-10-CM

## 2024-10-25 DIAGNOSIS — S83.511A DISRUPTION OF ANTERIOR CRUCIATE LIGAMENT OF KNEE, RIGHT, INITIAL ENCOUNTER: Primary | ICD-10-CM

## 2024-10-25 PROCEDURE — 97110 THERAPEUTIC EXERCISES: CPT | Mod: GP

## 2024-10-25 NOTE — PROGRESS NOTES
Physical Therapy Treatment      Patient Name: Lorenzo Zhu  MRN: [id number]  Today's Date: 10/25/2024  Visit #12  Time Calculation  Start Time: 0845  Stop Time: 0928  Time Calculation (min): 43 min    Insurance:  Visit Limit: 40  Date Range: No auth  Co-Pay: $50    Assessment:  Patient continues to progress with lower extremity strength, stability and control. Notable improvement in step down form and less hip IR/adduction compensation.    Patient's response to session: Decreased pain, Increased ROM/joint mobility, Increase motor control, and Increased knowledge and understanding    Plan:  Continue per POC.    Current Problem:   1. Right knee pain  Follow Up In Physical Therapy      2. Disruption of anterior cruciate ligament of knee, right, initial encounter  Follow Up In Physical Therapy                Subjective   Patient doing well, follow-up with Dr. Ruiz's PA went well.          Objective   Hip ROM (L/R)  Flexion: 125°/125°  Abduction: 45°/45°  Extension: 10°/10°  External Rotation: 45°/45°  Internal rotation: 45°/45°    Knee ROM (L/R)  Extension: 0°/0°  Flexion: 140°/135°    Dynamometer Strength testing in sitting:  R knee:  -flexion: 58#  -extension: 68.4#  L knee:  -flexion: 68.2#  -extension: 68.9#      Treatments:  Therapeutic Exercise (34754): 43 minutes  HEP review  Recumbent bike; 5 min  Lateral step-ups 4 in step x20  Retro step ups 4 in step -x20  Improving valgus control with descent  Retro step-ups 4 in step x20  SL PB HSC+bridge 3x10  Fwd lunge isometric hold 10 sec x10  SL HR on edge of step 2x20  BFR SL Leg Press (30,15,15,15) 80#  PB wall sits 90 deg 10 sec x10  SL PB wall sits 60 deg 10 sec x5      Education and discussion on HEP and treatment regarding the benefits related to current condition, POC, pathophysiology, and precautions    *added to HEP

## 2024-10-25 NOTE — PROGRESS NOTES
Physical Therapy Treatment      Patient Name: Lorenzo Zhu  MRN: [id number]  Today's Date: 10/25/2024  Visit #12  Time Calculation  Start Time: 0845  Stop Time: 0928  Time Calculation (min): 43 min    Insurance:  Visit Limit: 40  Date Range: No auth  Co-Pay: $50    Assessment:  Patient continues to progress well from a knee strength and stability standpoint. Quad strength nearly symmetrical to contralateral limb with dynamometer testing today. Added prone TKE's and isometric fwd lunge to HEP for improvement of knee extension control and eccentric quad activities. Patient progressing well with PT and would continue to benefit.    Patient's response to session: Decreased pain, Increased ROM/joint mobility, Increase motor control, and Increased knowledge and understanding    Plan:  Continue per POC.    Current Problem:   1. Disruption of anterior cruciate ligament of knee, right, initial encounter        2. Right knee pain                    Subjective   Patient doing well overall.  Notes some discomfort with end-range knee extension.           Objective   Hip ROM (L/R)  Flexion: 125°/125°  Abduction: 45°/45°  Extension: 10°/10°  External Rotation: 45°/45°  Internal rotation: 45°/45°    Knee ROM (L/R)  Extension: 0°/0°  Flexion: 140°/135°    Dynamometer Strength testing in sitting:  R knee:  -flexion: 58#  -extension: 68.4#  L knee:  -flexion: 68.2#  -extension: 68.9#      Treatments:  Therapeutic Exercise (40492): 43 minutes  HEP review  Recumbent bike; 5 min  Lateral step-ups 4 in step x20  Retro step ups 4 in step -x20  Improving valgus control with descent  Retro step-ups 4 in step x20  SL Leg press 3x20 90#  Prone TKE 2x20  Fwd lunge isometric hold 10 sec x10  BFR; LAQ; 7#  BFR; Squats;   80% occlusion pressure for BFR      Education and discussion on HEP and treatment regarding the benefits related to current condition, POC, pathophysiology, and precautions    *added to HEP

## 2024-11-08 ENCOUNTER — APPOINTMENT (OUTPATIENT)
Dept: PHYSICAL THERAPY | Facility: CLINIC | Age: 44
End: 2024-11-08
Payer: COMMERCIAL

## 2024-11-08 DIAGNOSIS — S83.511D DISRUPTION OF ANTERIOR CRUCIATE LIGAMENT OF KNEE, RIGHT, SUBSEQUENT ENCOUNTER: Primary | ICD-10-CM

## 2024-11-08 DIAGNOSIS — S83.511A DISRUPTION OF ANTERIOR CRUCIATE LIGAMENT OF KNEE, RIGHT, INITIAL ENCOUNTER: ICD-10-CM

## 2024-11-08 DIAGNOSIS — M25.561 RIGHT KNEE PAIN, UNSPECIFIED CHRONICITY: ICD-10-CM

## 2024-11-08 DIAGNOSIS — M25.561 RIGHT KNEE PAIN: ICD-10-CM

## 2024-11-08 PROCEDURE — 97110 THERAPEUTIC EXERCISES: CPT | Mod: GP | Performed by: PHYSICAL THERAPIST

## 2024-11-08 ASSESSMENT — PAIN - FUNCTIONAL ASSESSMENT: PAIN_FUNCTIONAL_ASSESSMENT: 0-10

## 2024-11-08 ASSESSMENT — PAIN SCALES - GENERAL: PAINLEVEL_OUTOF10: 0 - NO PAIN

## 2024-11-08 NOTE — PROGRESS NOTES
Physical Therapy Treatment      Patient Name: Lorenzo Zhu  MRN: [id number]  Today's Date: 11/8/2024  Visit #13  Time Calculation  Start Time: 0803  Stop Time: 0847  Time Calculation (min): 44 min    Insurance:  Visit Limit: 40  Date Range: No auth  Co-Pay: $50    Assessment:  Patient continues to progress with LE extremity strength, he continues to show IR of hip with step down tests. Increased exercise tolerance. Discussed return to to plyometric and running criteria as well as timeline. Educated him on what needs to be accomplished prior to initiation of this.    Patient's response to session: Decreased pain, Increased ROM/joint mobility, Increase motor control, and Increased knowledge and understanding    Plan:  Continue per POC.    Current Problem:   1. Disruption of anterior cruciate ligament of knee, right, subsequent encounter        2. Right knee pain  Follow Up In Physical Therapy      3. Disruption of anterior cruciate ligament of knee, right, initial encounter  Follow Up In Physical Therapy      4. Right knee pain, unspecified chronicity [M25.561]            Subjective   Patient took a week off from working out and got back into it this week. Patient reports having increased soreness after his leg workout on Monday. Continues to report stiffness.     Pain Assessment: 0-10  0-10 (Numeric) Pain Score: 0 - No pain    Precautions  Precautions Comment: No running/jumping/cutting  Objective   Hip ROM (L/R)  Flexion: 125°/125°  Abduction: 45°/45°  Extension: 10°/10°  External Rotation: 45°/45°  Internal rotation: 45°/45°    Knee ROM (L/R)  Extension: 0°/0°  Flexion: 140°/137°    Dynamometer Strength testing in sitting:  R knee:  -extension: 106.9#  L knee:  -extension: 139.4#  24% deficit    Treatments:  Therapeutic Exercise (15164): 40 minutes  HEP review  Upright bike; 5 min  Dynamic warm up  Lateral step-ups 8 in step x20  Standing clamshells with PB  Single leg bridge  Education on requirements for  initiation of plyometric exercises    Education and discussion on HEP and treatment regarding the benefits related to current condition, POC, pathophysiology, and precautions    *added to HEP    Care provided under direct supervision of Nico Moon, PT, DPT, OCS, CSCS

## 2024-11-22 ENCOUNTER — TREATMENT (OUTPATIENT)
Dept: PHYSICAL THERAPY | Facility: CLINIC | Age: 44
End: 2024-11-22
Payer: COMMERCIAL

## 2024-11-22 DIAGNOSIS — M25.561 ACUTE PAIN OF RIGHT KNEE: Primary | ICD-10-CM

## 2024-11-22 DIAGNOSIS — S83.511A DISRUPTION OF ANTERIOR CRUCIATE LIGAMENT OF KNEE, RIGHT, INITIAL ENCOUNTER: ICD-10-CM

## 2024-11-22 DIAGNOSIS — M25.561 RIGHT KNEE PAIN: ICD-10-CM

## 2024-11-22 PROCEDURE — 97140 MANUAL THERAPY 1/> REGIONS: CPT | Mod: GP | Performed by: PHYSICAL THERAPIST

## 2024-11-22 PROCEDURE — 97110 THERAPEUTIC EXERCISES: CPT | Mod: GP | Performed by: PHYSICAL THERAPIST

## 2024-11-22 ASSESSMENT — PAIN SCALES - GENERAL: PAINLEVEL_OUTOF10: 0 - NO PAIN

## 2024-11-22 ASSESSMENT — PAIN - FUNCTIONAL ASSESSMENT: PAIN_FUNCTIONAL_ASSESSMENT: 0-10

## 2024-11-22 NOTE — PROGRESS NOTES
Physical Therapy    Physical Therapy Treatment    Patient Name: Lorenzo Zhu  MRN: [id number]  Today's Date: 11/22/2024    Time Entry:   Time Calculation  Start Time: 1001  Stop Time: 1112  Time Calculation (min): 71 min    Insurance:  Visit Limit: 14/40  Date Range: No auth  Co-Pay: $50    Assessment:   Patient reports to new facility to transfer physical therapy based on his higher level of activity needs.  Patient and PT discussed his current exercise regiment and his current limitations with patient stating understanding to all education.  He continues to demonstrate a significant decrease in terminal knee extension upon presentation, but after manual work was able to demonstrate significant improvement in overall mobility. He was educated on exercises to continue to perform at home with patient stating understanding.  He was then able to perform isokinetic testing to assess his current strength compared to his contralateral for both quad and hamstring demonstrating a significant quadricep deficit, but at the level expected for his current post-op status.  Patient was educated about improving his overall quad strength on bilateral lower extremity due to not meeting body weight for his nonoperative, as well as closing the gap with the deficit and expected level to begin impact with patient stating understanding.  He will continue to progress with physical therapy to help increase his overall strength, power, endurance, mobility, and functional engagement to be able to return to sports.    Plan:   Pt would continue to benefit from LE ROM, strengthening, stretching, stability, mobility, balance, core engagement, and functional training to continue to decrease her overall pain and increase her function.    Progress with POC, as tolerated.    Monitor home program.      Current Problem  1. Acute pain of right knee        2. Right knee pain  Follow Up In Physical Therapy      3. Disruption of anterior cruciate  ligament of knee, right, initial encounter  Follow Up In Physical Therapy          General  PT  Visit  PT Received On: 24  General  General Comment: Pt reports to PT today stating his knee continues to feel progressively better but he still has tightness at the post knee into ext.    Pt is currently 13 weeks and 2 days s/p R/L ACL BTB patellar tendon autograft  with meniscus repair on 24.      Subjective    Precautions  Precautions  STEADI Fall Risk Score (The score of 4 or more indicates an increased risk of falling): 0  Precautions Comment: ACLR with BTB autograft    Pain  Pain Assessment  Pain Assessment: 0-10  0-10 (Numeric) Pain Score: 0 - No pain    Objective   Knee AROM:  (L) - (-10) - 140  (R) - 0 - 133     (R) knee post manual - (-7)    Isokinetic Testin d/s - peak torque quads  (R) - 111 (56% BW)   (L) - 165 (82% BW)   Deficit - 33    180 d/s - Peak Torque Quads  (R) - 82 (41% BW)   (L) - 95 (48% BW)   Deficit - 14    60 d/s Peak Torque Flexors  (R) - 83 (42% BW)  (L) - 121 (60% BW)   Deficit - 31    180 d/s Peak Torque Flexors  (R) - 47 (24% BW)   (L) - 58 (29% BW)       Deficit - 19      Treatments:  Therapeutic Exercise  Therapeutic Exercise Performed: Yes  Therapeutic Exercise Activity 1: Strap heel pops into ext x 10  Therapeutic Exercise Activity 2: Seated knee ext OP mobs x 10 with 5 sec hold  Therapeutic Exercise Activity 3: Dynamic warm-up  Therapeutic Exercise Activity 4: Isokinetic testing (B) LE x 20 mins    Manual Therapy  Manual Therapy Performed: Yes  Manual Therapy Activity 1: STM and TP release to distal quad, distal HS, and globally around the patella in long sitting  Manual Therapy Activity 2: Scar mobs in all directions at the incision sites to (R) kne  Manual Therapy Activity 3: Knee ext OP mobs with heel propped on (R) knee    OP EDUCATION:  Outpatient Education  Individual(s) Educated: Patient  Education Provided: Anatomy, Body Mechanics, Home Exercise Program, POC,  Post-Op Precautions  Patient/Caregiver Demonstrated Understanding: yes  Plan of Care Discussed and Agreed Upon: yes  Patient Response to Education: Patient/Caregiver Verbalized Understanding of Information, Patient/Caregiver Performed Return Demonstration of Exercises/Activities, Patient/Caregiver Asked Appropriate Questions

## 2024-11-26 ENCOUNTER — APPOINTMENT (OUTPATIENT)
Dept: PHYSICAL THERAPY | Facility: CLINIC | Age: 44
End: 2024-11-26
Payer: COMMERCIAL

## 2024-11-26 DIAGNOSIS — M25.561 RIGHT KNEE PAIN: ICD-10-CM

## 2024-11-27 ENCOUNTER — OFFICE VISIT (OUTPATIENT)
Dept: ORTHOPEDIC SURGERY | Facility: CLINIC | Age: 44
End: 2024-11-27
Payer: COMMERCIAL

## 2024-11-27 DIAGNOSIS — S83.511D DISRUPTION OF ANTERIOR CRUCIATE LIGAMENT OF KNEE, RIGHT, SUBSEQUENT ENCOUNTER: Primary | ICD-10-CM

## 2024-11-27 PROCEDURE — 1036F TOBACCO NON-USER: CPT | Performed by: PHYSICIAN ASSISTANT

## 2024-11-27 PROCEDURE — 99213 OFFICE O/P EST LOW 20 MIN: CPT | Performed by: PHYSICIAN ASSISTANT

## 2024-11-27 NOTE — PROGRESS NOTES
Subjective    Patient ID: Lorenzo Zhu is a 44 y.o. male.    Procedure: Right knee ACL reconstruction with patella tendon autograft  Date of surgery: 8/20/2024      HPI:  Lorenzo Zhu is a 44 y.o. male who is status post 3 months right knee ACL reconstruction with patella tendon autograft.  No problems or adverse events reported.  He has switched physical therapy in order to progress to a more functional program.  He presently denies any pain or swelling in his knee.  He has been using the bike and elliptical without difficulty.    ROS  Constitutional: No fever, no chills, not feeling tired, no recent weight gain and no recent weight loss  ENT: No nosebleeds  Cardiovascular: No chest pain  Respiratory: No shortness of breath and no cough  Gastrointestinal: No abdominal pain, no nausea, no diarrhea, and no vomiting  Musculoskeletal: No arthralgias  Integumentary: No rashes and no skin lesions  Neurological: No headache  Psychiatric: No sleep disturbances no depression  Endocrine: No muscle weakness and no muscle cramps  Hematologic/lymphatic: No swelling glands and no tendency for easy bruising    Past Medical History:   Diagnosis Date    Encounter for other general counseling and advice on contraception 02/19/2019    Vasectomy evaluation    Other specified health status     No pertinent past medical history    Personal history of other specified conditions 09/21/2017    History of nausea        Past Surgical History:   Procedure Laterality Date    ANTERIOR CRUCIATE LIGAMENT REPAIR Right 08/20/2024    Right knee ACL reconstruction with patella tendon autograft    APPENDECTOMY      OTHER SURGICAL HISTORY  02/19/2019    Cyst excision    VASECTOMY  2021          Current Outpatient Medications:     ketoconazole (NIZOral) 2 % cream, Apply sparingly to affected area twice daily (Patient not taking: Reported on 8/6/2024), Disp: 30 g, Rfl: 0    multivitamin tablet, Take 1 tablet by mouth once daily., Disp: , Rfl:      tirzepatide, weight loss, (Zepbound) 5 mg/0.5 mL injection, Inject 5 mg under the skin every 7 days., Disp: 4 each, Rfl: 2     No Known Allergies     Social Connections: Not on file            Objective   44-year-old male well appearing in no acute distress. Alert and oriented ×3.  Skin intact bilateral lower extremities.   Normal tandem gait. Coordination and balance intact.  Bilateral lower extremity compartments supple.  5 out of 5 distal motor strength bilaterally.  L4 through S1 sensation intact bilaterally.  2+ DP/PT pulses bilaterally.  Right knee incisions are well-healed with minimal scarring.  No joint effusion.  Improved quad tone.  Active range of motion 0 to 135 degrees, symmetric to the left knee.  Negative Lachman's.        Assessment/Plan   Encounter Diagnoses:  Disruption of anterior cruciate ligament of knee, right, subsequent encounter    Orders Placed This Encounter    ACL Brace       He is doing very well.  He is going to continue with physical therapy and may progress to a jogging program at his physical therapist discretion.  Appropriate activity and restrictions were reviewed.  Continue frequent icing use over-the-counter medicines as needed for any pain or discomfort.  He was measured for his functional brace today.  Will see him back again in 2 months.    Patient was prescribed a functional brace for ACL disruption.The patient is ambulatory with or without aid; but, has weakness of their right knee which requires stabilization from this orthosis to improve their function and protect the reconstruction.      Verbal and written instructions for the use, wear schedule, cleaning and application of this item were given.  Patient was instructed that should the brace result in increased pain, decreased sensation, increased swelling, or an overall worsening of their medical condition, to please contact our office immediately.     Orthotic management and training was provided for skin care,  modifications due to healing tissues, edema changes, interruption in skin integrity, and safety precautions with the orthosis.      This office note was dictated using Dragon voice to text software and was not proofread for spelling or grammatical errors

## 2024-12-06 ENCOUNTER — TREATMENT (OUTPATIENT)
Dept: PHYSICAL THERAPY | Facility: CLINIC | Age: 44
End: 2024-12-06
Payer: COMMERCIAL

## 2024-12-06 DIAGNOSIS — M25.561 RIGHT KNEE PAIN: ICD-10-CM

## 2024-12-06 DIAGNOSIS — S83.511D DISRUPTION OF ANTERIOR CRUCIATE LIGAMENT OF KNEE, RIGHT, SUBSEQUENT ENCOUNTER: Primary | ICD-10-CM

## 2024-12-06 DIAGNOSIS — M25.561 ACUTE PAIN OF RIGHT KNEE: ICD-10-CM

## 2024-12-06 PROCEDURE — 97140 MANUAL THERAPY 1/> REGIONS: CPT | Mod: GP | Performed by: PHYSICAL THERAPIST

## 2024-12-06 PROCEDURE — 97110 THERAPEUTIC EXERCISES: CPT | Mod: GP | Performed by: PHYSICAL THERAPIST

## 2024-12-06 ASSESSMENT — PAIN SCALES - GENERAL: PAINLEVEL_OUTOF10: 0 - NO PAIN

## 2024-12-06 ASSESSMENT — PAIN - FUNCTIONAL ASSESSMENT: PAIN_FUNCTIONAL_ASSESSMENT: 0-10

## 2024-12-06 NOTE — PROGRESS NOTES
Physical Therapy    Physical Therapy Treatment    Patient Name: Lorenzo Zhu  MRN: [id number]  Today's Date: 12/6/2024    Time Entry:   Time Calculation  Start Time: 0901  Stop Time: 1013  Time Calculation (min): 72 min    Insurance:  Visit Limit: 15/40  Date Range: No auth  Co-Pay: $50    Assessment:   Patient continues to present with a decrease in terminal knee extension, but after prone hang manual work and knee extension overpressure he was able to achieve a notable improvement compared to presentation.  He continues to lack a few degrees of extension compared to his nonoperative side, but is making steady progress each session.  Patient was able to tolerate a single-leg functional loading therapy session well today needing intermittent cueing for proper form and engagement but with improved carryover.  He continues to need intermittent cueing to increase glutes recruitment to decrease functional knee valgus with improved carryover.  Patient and PT discussed frequency of single-leg focus strengthening with patient stating understanding and will attempt to push himself more into functional and straight plane strengthening throughout the week.  He continues to progress well towards his goals for therapy at this time    Plan:   Pt would continue to benefit from LE ROM, strengthening, stretching, stability, mobility, balance, core engagement, and functional training to continue to decrease his overall pain and increase his function.    Progress with POC, as tolerated.    Monitor home program.       Current Problem  1. Disruption of anterior cruciate ligament of knee, right, subsequent encounter        2. Right knee pain  Follow Up In Physical Therapy      3. Acute pain of right knee [M25.561]            General  PT  Visit  PT Received On: 12/06/24  General  General Comment: Pt reports to PT today stating his knee has been feeling progressively better but his ext felt like it went right back to where it was after  the manual work. He reports he has been attempting to push his range where he can.    Pt is currently 15 weeks and 3 days s/p R/L ACL BTB patellar tendon autograft  with meniscus repair on 8/20/24.      Subjective    Precautions  Precautions  STEADI Fall Risk Score (The score of 4 or more indicates an increased risk of falling): 0  Precautions Comment: ACLR with BTB autograft    Pain  Pain Assessment  Pain Assessment: 0-10  0-10 (Numeric) Pain Score: 0 - No pain    Objective     Pt presented with ext at 0; reached -6 after manual work with prone hang    Treatments:  Therapeutic Exercise  Therapeutic Exercise Performed: Yes  Therapeutic Exercise Activity 1: Bike x 6 mins  Therapeutic Exercise Activity 2: Dynamic warm-up: knees to chest, butt kicks, open/close gate, side lunge, field goal, tin soilder, heel swipes  Therapeutic Exercise Activity 3: Resisted side step (blue band) x 20 yrds R/L  Therapeutic Exercise Activity 4: Resisted zig zag step (blue band) x 20 yrds ea F/B  Therapeutic Exercise Activity 5: 4-way hip with 15# DB 3 x 5 ea R/L  Therapeutic Exercise Activity 6: SL heel taps lat/back with BW on 12 inch step 3 x 6 ea R/L  Therapeutic Exercise Activity 7: SL RDL's with 44# KB 3 x 8 on (R)  Therapeutic Exercise Activity 8: SL alt squat to box with BW to 18 inch box 3 x 6 ea R/L    Manual Therapy  Manual Therapy Performed: Yes  Manual Therapy Activity 1: Prone hang with 5# with IASTM and TP release to HS, gastroc, soleus, and across post knee in prone  Manual Therapy Activity 2: Knee ext OP mobs with heel propped on (R) knee    OP EDUCATION:  Outpatient Education  Individual(s) Educated: Patient  Education Provided: Anatomy, Body Mechanics, Home Exercise Program, POC, Post-Op Precautions  Patient/Caregiver Demonstrated Understanding: yes  Plan of Care Discussed and Agreed Upon: yes  Patient Response to Education: Patient/Caregiver Verbalized Understanding of Information, Patient/Caregiver Performed Return  Demonstration of Exercises/Activities, Patient/Caregiver Asked Appropriate Questions

## 2024-12-13 ENCOUNTER — TREATMENT (OUTPATIENT)
Dept: PHYSICAL THERAPY | Facility: CLINIC | Age: 44
End: 2024-12-13
Payer: COMMERCIAL

## 2024-12-13 DIAGNOSIS — S83.511D DISRUPTION OF ANTERIOR CRUCIATE LIGAMENT OF KNEE, RIGHT, SUBSEQUENT ENCOUNTER: Primary | ICD-10-CM

## 2024-12-13 DIAGNOSIS — M25.561 RIGHT KNEE PAIN: ICD-10-CM

## 2024-12-13 DIAGNOSIS — M25.561 ACUTE PAIN OF RIGHT KNEE: ICD-10-CM

## 2024-12-13 PROCEDURE — 97110 THERAPEUTIC EXERCISES: CPT | Mod: GP | Performed by: PHYSICAL THERAPIST

## 2024-12-13 ASSESSMENT — PAIN SCALES - GENERAL: PAINLEVEL_OUTOF10: 0 - NO PAIN

## 2024-12-13 ASSESSMENT — PAIN - FUNCTIONAL ASSESSMENT: PAIN_FUNCTIONAL_ASSESSMENT: 0-10

## 2024-12-13 NOTE — PROGRESS NOTES
Physical Therapy    Physical Therapy Treatment    Patient Name: Lorenzo Zhu  MRN: [id number]  Today's Date: 12/13/2024    Time Entry:   Time Calculation  Start Time: 0901  Stop Time: 1002  Time Calculation (min): 61 min    Insurance:  Visit Limit: 16/40  Date Range: No auth  Co-Pay: $50    Assessment:   Patient continues to present to PT with a slight decrease in terminal knee extension, but was able to achieve full range with focal joint cooling and weighted heel prop without need for manual work.  He reported a little stiffness after joint cooling but could tell an increase in his terminal knee extension without increase in pain.  Patient was then able to work through dynamic warm up and progress into heavy single-leg strength machines with functional movement pattern engagement reporting significant fatigue at the end of the session but no increase in pain or irritation.  He needs intermittent cueing for proper form and engagement, but is showing steady progress in his overall functional engagement at this time.  Patient was able to progress his weights as tolerated and stated at the end of 3 rounds he had had enough load and was worried an additional round might be more detrimental than helpful.  He continues to progress well towards his goals for therapy at this time.    Plan:   Pt would continue to benefit from LE ROM, strengthening, stretching, stability, mobility, balance, core engagement, and functional training to continue to decrease his overall pain and increase his function.    Progress with POC, as tolerated.    Monitor home program.     Current Problem  1. Disruption of anterior cruciate ligament of knee, right, subsequent encounter        2. Right knee pain  Follow Up In Physical Therapy      3. Acute pain of right knee [M25.561]            General  PT  Visit  PT Received On: 12/13/24  General  General Comment: Pt reports to PT today stating his knee was sore after his last session but felt  muscular and not notably painful. He continues to do his HEP consistently.    Pt is currently 16 weeks and 3 days s/p R ACL BTB patellar tendon autograft  with meniscus repair on 8/20/24.      Subjective    Precautions  Precautions  STEADI Fall Risk Score (The score of 4 or more indicates an increased risk of falling): 0  Precautions Comment: ACLR with BTB autograft    Pain  Pain Assessment  Pain Assessment: 0-10  0-10 (Numeric) Pain Score: 0 - No pain    Objective   (R) knee ext ROM: -6    Treatments:  Therapeutic Exercise  Therapeutic Exercise Performed: Yes  Therapeutic Exercise Activity 1: Focal jt cooling with heel propped and 10# weight x 10 mins  Therapeutic Exercise Activity 2: Bike x 6 mins  Therapeutic Exercise Activity 3: Dynamic warm-up: knees to chest, butt kicks, open/close gate, side lunge, field goal, tin soilder, heel swipes  Therapeutic Exercise Activity 4: Resisted side step (blue band) x 20 yrds R/L  Therapeutic Exercise Activity 5: Resisted zig zag step (blue band) x 20 yrds ea F/B  Therapeutic Exercise Activity 6: SL leg press 3 x 6 at 200(R) and 240# (L)  Therapeutic Exercise Activity 7: Lunge iso hold 3 x 30 sec ea R/L  Therapeutic Exercise Activity 8: SL knee ext 3 x 6 at 80# (R) and 100# (L)  Therapeutic Exercise Activity 9: Lateral landmind lunge with 70# 3 x 8 ea R/L  Therapeutic Exercise Activity 10: SL HS curls 3 x 6 at 50# R/L    OP EDUCATION:  Outpatient Education  Individual(s) Educated: Patient  Education Provided: Anatomy, Body Mechanics, Home Exercise Program, POC, Post-Op Precautions  Patient/Caregiver Demonstrated Understanding: yes  Plan of Care Discussed and Agreed Upon: yes  Patient Response to Education: Patient/Caregiver Verbalized Understanding of Information, Patient/Caregiver Performed Return Demonstration of Exercises/Activities, Patient/Caregiver Asked Appropriate Questions

## 2024-12-20 ENCOUNTER — TREATMENT (OUTPATIENT)
Dept: PHYSICAL THERAPY | Facility: CLINIC | Age: 44
End: 2024-12-20
Payer: COMMERCIAL

## 2024-12-20 DIAGNOSIS — M25.561 ACUTE PAIN OF RIGHT KNEE: ICD-10-CM

## 2024-12-20 DIAGNOSIS — M25.561 RIGHT KNEE PAIN: ICD-10-CM

## 2024-12-20 DIAGNOSIS — S83.511D DISRUPTION OF ANTERIOR CRUCIATE LIGAMENT OF KNEE, RIGHT, SUBSEQUENT ENCOUNTER: Primary | ICD-10-CM

## 2024-12-20 PROCEDURE — 97110 THERAPEUTIC EXERCISES: CPT | Mod: GP | Performed by: PHYSICAL THERAPIST

## 2024-12-20 ASSESSMENT — PAIN SCALES - GENERAL: PAINLEVEL_OUTOF10: 0 - NO PAIN

## 2024-12-20 ASSESSMENT — PAIN - FUNCTIONAL ASSESSMENT: PAIN_FUNCTIONAL_ASSESSMENT: 0-10

## 2024-12-20 NOTE — PROGRESS NOTES
Physical Therapy    Physical Therapy Treatment    Patient Name: Lorenzo Zhu  MRN: [id number]  Today's Date: 12/20/2024    Time Entry:   Time Calculation  Start Time: 0855  Stop Time: 1001  Time Calculation (min): 66 min       Insurance:  Visit Limit: 17/40  Date Range: No auth  Co-Pay: $50    Assessment:   Patient continues to present to PT with a decrease in terminal knee extension due to muscle tightness and restriction.  He is able to regain his current range for knee extension with joint cooling and weight without the need for manual work.  Patient was able to perform dynamic warm up and band work without an increase in irritation.  He was then able to perform high-level endurance related strengthening without an increase in pain or irritation, just significant fatigue.  Patient's form started to decrease with single-leg RDL's and core control with fatigue, but he was able to complete all reps without increase in pain or irritation.  He continues to be educated about the importance of single-leg exercises outside of PT and states he will continue to push himself to get to the gym to perform more consistently.  Patient continues to progress well towards his goals for therapy at this time.    Plan:   Pt would continue to benefit from LE ROM, strengthening, stretching, stability, mobility, balance, core engagement, and functional training to continue to decrease his overall pain and increase his function.    Progress with POC, as tolerated.    Monitor home program.     Current Problem  1. Disruption of anterior cruciate ligament of knee, right, subsequent encounter        2. Right knee pain  Follow Up In Physical Therapy      3. Acute pain of right knee [M25.561]            General  PT  Visit  PT Received On: 12/20/24  General  General Comment: Pt reports to PT today stating his knee has continued to feel progressively better and he hasn't had an increase irritation lately. He reports he hasn't done his HEP as  consistently as he could.    Pt is currently 17 weeks and 3 days s/p R ACL BTB patellar tendon autograft  with meniscus repair on 8/20/24.      Subjective    Precautions  Precautions  STEADI Fall Risk Score (The score of 4 or more indicates an increased risk of falling): 0  Precautions Comment: ACLR with BTB autograft    Pain  Pain Assessment  Pain Assessment: 0-10  0-10 (Numeric) Pain Score: 0 - No pain    Objective   (R) knee ext after focal jt cooling: -7      Treatments:  Therapeutic Exercise  Therapeutic Exercise Performed: Yes  Therapeutic Exercise Activity 1: Focal jt cooling with heel propped and 10# weight x 10 mins  Therapeutic Exercise Activity 2: Bike x 6 mins  Therapeutic Exercise Activity 3: Dynamic warm-up: knees to chest, butt kicks, open/close gate, side lunge, field goal, tin soilder, heel swipes  Therapeutic Exercise Activity 4: Resisted side step (blue band) x 20 yrds R/L  Therapeutic Exercise Activity 5: Resisted zig zag step (blue band) x 20 yrds ea F/B  Therapeutic Exercise Activity 6: Overhead walking lunges with 15kg plate 3 x 40 yrds  Therapeutic Exercise Activity 7: Alt SL squats to 20 inch box 3 x 6 ea R/L  Therapeutic Exercise Activity 8: Sled push high/low 3 x 20 yrds ea with 50kg  Therapeutic Exercise Activity 9: SL RDL's with 54# KB 3 x 6 ea R/L  Therapeutic Exercise Activity 10: Sumo squat with 40kg KB 3 x 8      OP EDUCATION:  Outpatient Education  Individual(s) Educated: Patient  Education Provided: Anatomy, Body Mechanics, Home Exercise Program, POC, Post-Op Precautions  Patient/Caregiver Demonstrated Understanding: yes  Plan of Care Discussed and Agreed Upon: yes  Patient Response to Education: Patient/Caregiver Verbalized Understanding of Information, Patient/Caregiver Performed Return Demonstration of Exercises/Activities, Patient/Caregiver Asked Appropriate Questions

## 2024-12-27 ENCOUNTER — APPOINTMENT (OUTPATIENT)
Dept: ORTHOPEDIC SURGERY | Facility: CLINIC | Age: 44
End: 2024-12-27
Payer: COMMERCIAL

## 2024-12-27 ENCOUNTER — APPOINTMENT (OUTPATIENT)
Dept: PHYSICAL THERAPY | Facility: CLINIC | Age: 44
End: 2024-12-27
Payer: COMMERCIAL

## 2024-12-27 DIAGNOSIS — M25.561 RIGHT KNEE PAIN: ICD-10-CM

## 2025-01-02 ENCOUNTER — APPOINTMENT (OUTPATIENT)
Dept: PHYSICAL THERAPY | Facility: CLINIC | Age: 45
End: 2025-01-02
Payer: COMMERCIAL

## 2025-01-02 DIAGNOSIS — M25.561 RIGHT KNEE PAIN: ICD-10-CM

## 2025-01-10 ENCOUNTER — TREATMENT (OUTPATIENT)
Dept: PHYSICAL THERAPY | Facility: CLINIC | Age: 45
End: 2025-01-10
Payer: COMMERCIAL

## 2025-01-10 DIAGNOSIS — S83.411A TEAR OF MCL (MEDIAL COLLATERAL LIGAMENT) OF KNEE, RIGHT, INITIAL ENCOUNTER: ICD-10-CM

## 2025-01-10 DIAGNOSIS — M25.561 CHRONIC PAIN OF RIGHT KNEE: ICD-10-CM

## 2025-01-10 DIAGNOSIS — G89.29 CHRONIC PAIN OF RIGHT KNEE: ICD-10-CM

## 2025-01-10 DIAGNOSIS — S83.511D DISRUPTION OF ANTERIOR CRUCIATE LIGAMENT OF KNEE, RIGHT, SUBSEQUENT ENCOUNTER: Primary | ICD-10-CM

## 2025-01-10 DIAGNOSIS — M25.561 RIGHT KNEE PAIN: ICD-10-CM

## 2025-01-10 PROCEDURE — 97110 THERAPEUTIC EXERCISES: CPT | Mod: GP | Performed by: PHYSICAL THERAPIST

## 2025-01-10 PROCEDURE — 97140 MANUAL THERAPY 1/> REGIONS: CPT | Mod: GP | Performed by: PHYSICAL THERAPIST

## 2025-01-10 ASSESSMENT — PAIN - FUNCTIONAL ASSESSMENT: PAIN_FUNCTIONAL_ASSESSMENT: 0-10

## 2025-01-10 ASSESSMENT — PAIN SCALES - GENERAL: PAINLEVEL_OUTOF10: 0 - NO PAIN

## 2025-01-10 NOTE — PROGRESS NOTES
Physical Therapy    Physical Therapy Treatment    Patient Name: Lorenzo Zhu  MRN: [id number]  Today's Date: 1/10/2025    Time Entry:   Time Calculation  Start Time: 1003  Stop Time: 1122  Time Calculation (min): 79 min       Insurance:   Visit Limit: 18/40  Date Range: No auth  Co-Pay: $50    Assessment:   Pt presents to PT reporting some stiffness in the distal hamstring on the (R) knee, which responded favorably to IASTM tx. He tolerated tx session well as detailed below. Tx emphasized ROM, strength, isokinetic's, and plyometrics. Significant time was spent educating pt on his functional status and appropriate forms of physical activity given his current deficits and status in the return to play progression. Pt was advised to avoid/minimize cutting/pivoting/skating activities and hockey game play. His overall strength deficits continue to improve as indicated with isokinetic testing, revealing a 17% deficit in his quad strength and no deficits in his hamstring strength, but global weakness (B) in both HS and quad which are more slowly improving. He continues to be appropriate for skilled PT to address the deficits remaining and continue progressing towards return to play. PT to focus on strength, plyometrics, endurance, velocity dependent movements, return to play progressions, and modalities as appropriate.     Plan:    Pt would continue to benefit from LE ROM, strengthening, stretching, stability, mobility, balance, core engagement, and functional training to continue to decrease his overall pain and increase his function.    Progress with POC, as tolerated.    Monitor home program.     Current Problem  1. Disruption of anterior cruciate ligament of knee, right, subsequent encounter        2. Right knee pain        3. Tear of MCL (medial collateral ligament) of knee, right, initial encounter        4. Chronic pain of right knee [M25.561, G89.29]            General  PT  Visit  PT Received On:  01/10/25  General  General Comment: Pt reports to PT stating he has taken 2 weeks off from any form of exercise. He states he has been playing ice hockey about 3x/week, completing puck drills and attempting to avoid cutting and pivoting during practice. He reports he has been a little more sore and achey since last visit, since beginning hockey. He has been icing every other day and reports completing his HEP a few times a week.    Pt is currently 20 weeks and 3 days s/p R ACL BTB patellar tendon autograft  with meniscus repair on 24.     Subjective    Precautions  Precautions  STEADI Fall Risk Score (The score of 4 or more indicates an increased risk of falling): 0  Precautions Comment: ACLR with BTB autograft     Pain  Pain Assessment  Pain Assessment: 0-10  0-10 (Numeric) Pain Score: 0 - No pain    Objective     Isokinetic Testin d/s - peak torque quads  (R) - 140 (70% BW)   (L) - 168 (84% BW)   Deficit - 17     180 d/s - Peak Torque Quads  (R) - 103 (52% BW)   (L) - 125 (62% BW)   Deficit - 18     60 d/s Peak Torque Flexors  (R) - 96 (48% BW)  (L) - 92 (46% BW)   Deficit - (-4)     180 d/s Peak Torque Flexors  (R) - 59 (30% BW)   (L) - 61 (30% BW)       Deficit - 3    Treatments:  Therapeutic Exercise  Therapeutic Exercise Performed: Yes  Therapeutic Exercise Activity 1: Bike x 5 mins  Therapeutic Exercise Activity 2: Prone knee hang at 10# with manual therapy  Therapeutic Exercise Activity 3: Dynamic warm-up: knees to chest, butt kicks, open/close gate, side lunge, field goal, tin soilder, heel swipes  Therapeutic Exercise Activity 4: Resisted side step (blue band) x 20 yrds R/L  Therapeutic Exercise Activity 5: Resisted zig zag step (blue band) x 20 yrds ea F/B  Therapeutic Exercise Activity 6: SL knee ext 3x10 at 50# R/L  Therapeutic Exercise Activity 7: Isokinetic testing x20 min    Manual Therapy  Manual Therapy Performed: Yes  Manual Therapy Activity 1: IASTM distal hamstring and  gastroc  Manual Therapy Activity 2: patellar joint mobs medial/lateral glides grade 1-2    OP EDUCATION:  Outpatient Education  Individual(s) Educated: Patient  Education Provided: Anatomy, Body Mechanics, Home Exercise Program, POC, Post-Op Precautions  Patient/Caregiver Demonstrated Understanding: yes  Plan of Care Discussed and Agreed Upon: yes  Patient Response to Education: Patient/Caregiver Verbalized Understanding of Information, Patient/Caregiver Performed Return Demonstration of Exercises/Activities, Patient/Caregiver Asked Appropriate Questions  Education Comment: Pt was educated regarding return to play progressions and limiting hockey play due to current stage of healing.

## 2025-01-17 ENCOUNTER — APPOINTMENT (OUTPATIENT)
Dept: PHYSICAL THERAPY | Facility: CLINIC | Age: 45
End: 2025-01-17
Payer: COMMERCIAL

## 2025-01-17 DIAGNOSIS — M25.561 RIGHT KNEE PAIN: ICD-10-CM

## 2025-01-22 ENCOUNTER — OFFICE VISIT (OUTPATIENT)
Dept: ORTHOPEDIC SURGERY | Facility: CLINIC | Age: 45
End: 2025-01-22
Payer: COMMERCIAL

## 2025-01-22 DIAGNOSIS — S83.511D DISRUPTION OF ANTERIOR CRUCIATE LIGAMENT OF KNEE, RIGHT, SUBSEQUENT ENCOUNTER: Primary | ICD-10-CM

## 2025-01-22 PROCEDURE — 1036F TOBACCO NON-USER: CPT | Performed by: PHYSICIAN ASSISTANT

## 2025-01-22 PROCEDURE — 99213 OFFICE O/P EST LOW 20 MIN: CPT | Performed by: PHYSICIAN ASSISTANT

## 2025-01-22 ASSESSMENT — PAIN - FUNCTIONAL ASSESSMENT: PAIN_FUNCTIONAL_ASSESSMENT: NO/DENIES PAIN

## 2025-01-22 NOTE — PROGRESS NOTES
Subjective    Patient ID: Lorenzo Zhu is a 44 y.o. male.    Procedure: Right knee ACL reconstruction with patella tendon autograft  Date of surgery: 8/20/2020      HPI:  Lorenzo Zhu is a 44 y.o. male who is status post 5 months right knee ACL reconstruction with patella tendon autograft.  No problems or adverse events reported.  He has been participating in physical therapy.  He states that he is preparing to start the jogging program.  He presently denies any pain or discomfort.  He has his functional brace and feels that it fits well.    ROS  Constitutional: No fever, no chills, not feeling tired, no recent weight gain and no recent weight loss  ENT: No nosebleeds  Cardiovascular: No chest pain  Respiratory: No shortness of breath and no cough  Gastrointestinal: No abdominal pain, no nausea, no diarrhea, and no vomiting  Musculoskeletal: No arthralgias  Integumentary: No rashes and no skin lesions  Neurological: No headache  Psychiatric: No sleep disturbances no depression  Endocrine: No muscle weakness and no muscle cramps  Hematologic/lymphatic: No swelling glands and no tendency for easy bruising    Past Medical History:   Diagnosis Date    Encounter for other general counseling and advice on contraception 02/19/2019    Vasectomy evaluation    Other specified health status     No pertinent past medical history    Personal history of other specified conditions 09/21/2017    History of nausea        Past Surgical History:   Procedure Laterality Date    ANTERIOR CRUCIATE LIGAMENT REPAIR Right 08/20/2024    Right knee ACL reconstruction with patella tendon autograft    APPENDECTOMY      OTHER SURGICAL HISTORY  02/19/2019    Cyst excision    VASECTOMY  2021          Current Outpatient Medications:     ketoconazole (NIZOral) 2 % cream, Apply sparingly to affected area twice daily (Patient not taking: Reported on 8/6/2024), Disp: 30 g, Rfl: 0    multivitamin tablet, Take 1 tablet by mouth once daily., Disp:  , Rfl:     tirzepatide, weight loss, (Zepbound) 5 mg/0.5 mL injection, Inject 5 mg under the skin every 7 days., Disp: 4 each, Rfl: 2     No Known Allergies     Social Connections: Not on file            Objective   44-year-old male well appearing in no acute distress. Alert and oriented ×3.  Skin intact bilateral lower extremities.   Normal tandem gait. Coordination and balance intact.  Bilateral lower extremity compartments supple.  5 out of 5 distal motor strength bilaterally.  L4 through S1 sensation intact bilaterally.  2+ DP/PT pulses bilaterally.  Right knee with no joint effusion.  He has improved quad tone.  Active range of motion 0 to 135 degrees, symmetric to the left knee.  Negative Lachman's.  No tenderness over the patellar tendon.      Assessment/Plan   Encounter Diagnoses:  Disruption of anterior cruciate ligament of knee, right, subsequent encounter    No orders of the defined types were placed in this encounter.      Overall he is doing well.  He we will begin the jogging program.  He does have a goal of returning back to ice hockey.  I recommended that he wait until least 7 months postop before getting on the ice.  He should utilize his functional brace for that.  Continue frequent icing use over-the-counter medicines for any discomfort.  Will see him back again around the beginning of May.    This office note was dictated using Dragon voice to text software and was not proofread for spelling or grammatical errors

## 2025-01-24 ENCOUNTER — TREATMENT (OUTPATIENT)
Dept: PHYSICAL THERAPY | Facility: CLINIC | Age: 45
End: 2025-01-24
Payer: COMMERCIAL

## 2025-01-24 DIAGNOSIS — S83.511D DISRUPTION OF ANTERIOR CRUCIATE LIGAMENT OF KNEE, RIGHT, SUBSEQUENT ENCOUNTER: ICD-10-CM

## 2025-01-24 DIAGNOSIS — G89.29 CHRONIC PAIN OF RIGHT KNEE: ICD-10-CM

## 2025-01-24 DIAGNOSIS — M25.561 CHRONIC PAIN OF RIGHT KNEE: ICD-10-CM

## 2025-01-24 DIAGNOSIS — M25.561 RIGHT KNEE PAIN: Primary | ICD-10-CM

## 2025-01-24 DIAGNOSIS — S83.411A TEAR OF MCL (MEDIAL COLLATERAL LIGAMENT) OF KNEE, RIGHT, INITIAL ENCOUNTER: ICD-10-CM

## 2025-01-24 PROCEDURE — 97110 THERAPEUTIC EXERCISES: CPT | Mod: GP | Performed by: PHYSICAL THERAPIST

## 2025-01-24 ASSESSMENT — PAIN - FUNCTIONAL ASSESSMENT: PAIN_FUNCTIONAL_ASSESSMENT: 0-10

## 2025-01-24 ASSESSMENT — PAIN SCALES - GENERAL: PAINLEVEL_OUTOF10: 0 - NO PAIN

## 2025-01-24 NOTE — PROGRESS NOTES
Physical Therapy    Physical Therapy Treatment    Patient Name: Lorenzo Zhu  MRN: [id number]  Today's Date: 1/24/2025    Time Entry:   Time Calculation  Start Time: 0953  Stop Time: 1101  Time Calculation (min): 68 min       Insurance:   Visit Limit: 19/40  Date Range: No auth  Co-Pay: $50    Assessment:   Pt tolerated tx session well as detailed below. Tx emphasized impact training and single leg strength. He presented with no stiffness, so manual tx was held off today. Pt demonstrated good tolerance and motor control with pogo jump variations. He has mild amounts of weight shifting to the left lower extremity upon landing drop vertical jumps, which improved with verbal cueing to emphasize weight through the right lower extremity. With drop vertical jumps he demonstrated a slight left rotation upon second landing as he was pushing off the vertical jump with the right lower extremity greater than the left. Pt continued to demonstrate mild Left lower extremity weight shift with broad jump landing in the drop vertical to broad jump sequence. He continues to demonstrate fatigue with single leg strength training, especially when endurance is emphasized with heavy loads, as opposed to sets and reps. Pt education regarding activity restrictions given phase of rehab were re-emphasized this visit. Pt continues to be appropriate for skilled PT to address the deficits remaining below and continue progressing towards return to running and hockey. PT to focus on strength, plyometrics, impact training, and manual therapy/modalities as needed.     Plan:   Pt would continue to benefit from LE ROM, strengthening, stretching, stability, mobility, balance, core engagement, and functional training to continue to decrease his overall pain and increase his function.    Progress with POC, as tolerated.    Monitor home program.     Current Problem  1. Right knee pain        2. Disruption of anterior cruciate ligament of knee, right,  subsequent encounter        3. Tear of MCL (medial collateral ligament) of knee, right, initial encounter            General  PT  Visit  PT Received On: 01/24/25  Response to Previous Treatment: Patient with no complaints from previous session.  General  General Comment: Pt reports to PT stating he felt good after last session with no pain. He states he feels he is finally getting back into a routine with strength training 3 days/week and prioritizing extension.    Pt is currently 22 weeks and 3 days s/p R ACL BTB patellar tendon autograft  with meniscus repair on 8/20/24.     Subjective    Precautions  Precautions  STEADI Fall Risk Score (The score of 4 or more indicates an increased risk of falling): 0  Precautions Comment: ACLR with BTB autograft    Pain  Pain Assessment  Pain Assessment: 0-10  0-10 (Numeric) Pain Score: 0 - No pain    Objective   Pt tolerated impact training well but continues to demonstrate fatigue with SL strength training with endurance focus.     Treatments:  Therapeutic Exercise  Therapeutic Exercise Performed: Yes  Therapeutic Exercise Activity 1: Bike intervals 10 on/10 off x 5 mins  Therapeutic Exercise Activity 2: Prone knee hang at 10# with manual therapy  Therapeutic Exercise Activity 3: Dynamic warm-up: knees to chest, butt kicks, open/close gate, side lunge, field goal, tin soilder, heel swipes  Therapeutic Exercise Activity 4: Resisted side step (black band) x 20 yrds R/L  Therapeutic Exercise Activity 5: Resisted zig zag step (black band) x 20 yrds ea F/B  Therapeutic Exercise Activity 6: Resisted monster walks (black band) x20 yds fwd/bwd  Therapeutic Exercise Activity 7: DL band assisted pogos x20; without band assist 2x20  Therapeutic Exercise Activity 8: SL band assisted pogos 3x20 R/L  Therapeutic Exercise Activity 9: DL drop jumps from 24 inch box 3x8 R/L  Therapeutic Exercise Activity 10: DL drop to vertical jump from 24 inch box 3x8 R/L  Therapeutic Exercise Activity 11:  DL drop to vertical to broad jump from 24 in box 3x8 R/L  Therapeutic Exercise Activity 12: SL leg press at 180# 2x1 min R/L  Therapeutic Exercise Activity 13: lat lunge at 45# KB 3x8 R/L  Therapeutic Exercise Activity 14: SL hamstring curls starting at 40# and reducing to 30# 2x1 min R/L    OP EDUCATION:  Outpatient Education  Individual(s) Educated: Patient  Education Provided: Anatomy, Body Mechanics, Home Exercise Program, POC, Post-Op Precautions  Patient/Caregiver Demonstrated Understanding: yes  Plan of Care Discussed and Agreed Upon: yes  Patient Response to Education: Patient/Caregiver Verbalized Understanding of Information, Patient/Caregiver Performed Return Demonstration of Exercises/Activities, Patient/Caregiver Asked Appropriate Questions  Education Comment: Pt was educated regarding return to play progressions and limiting hockey play due to current stage of healing.

## 2025-01-31 ENCOUNTER — TREATMENT (OUTPATIENT)
Dept: PHYSICAL THERAPY | Facility: CLINIC | Age: 45
End: 2025-01-31
Payer: COMMERCIAL

## 2025-01-31 DIAGNOSIS — M25.561 RIGHT KNEE PAIN: ICD-10-CM

## 2025-01-31 DIAGNOSIS — M25.561 CHRONIC PAIN OF RIGHT KNEE: ICD-10-CM

## 2025-01-31 DIAGNOSIS — G89.29 CHRONIC PAIN OF RIGHT KNEE: ICD-10-CM

## 2025-01-31 DIAGNOSIS — S83.511D DISRUPTION OF ANTERIOR CRUCIATE LIGAMENT OF KNEE, RIGHT, SUBSEQUENT ENCOUNTER: Primary | ICD-10-CM

## 2025-01-31 PROCEDURE — 97140 MANUAL THERAPY 1/> REGIONS: CPT | Mod: GP | Performed by: PHYSICAL THERAPIST

## 2025-01-31 PROCEDURE — 97110 THERAPEUTIC EXERCISES: CPT | Mod: GP | Performed by: PHYSICAL THERAPIST

## 2025-01-31 ASSESSMENT — PAIN SCALES - GENERAL: PAINLEVEL_OUTOF10: 0 - NO PAIN

## 2025-01-31 ASSESSMENT — PAIN - FUNCTIONAL ASSESSMENT: PAIN_FUNCTIONAL_ASSESSMENT: 0-10

## 2025-01-31 NOTE — PROGRESS NOTES
Physical Therapy    Physical Therapy Treatment    Patient Name: Lorenzo Zhu  MRN: [id number]  Today's Date: 1/31/2025    Time Entry:   Time Calculation  Start Time: 803  Stop Time: 901  Time Calculation (min): 58 min    Insurance:   Visit Limit: 20/40  Date Range: No auth  Co-Pay: $50    Assessment:    Pt tolerated  tx well today with continued emphasis on SL and DL jumping with form and proper mechanics emphasis. He reported with an increase in mm tension and patellar tendon soreness which responded well to manual work. He struggled slightly more with high level DL jumping with consistent favoring onto his non-operative leg but was getting progressively better with continued reps. He demonstrated strong performance of SL jumping but needed somewhat constant cueing to increase his knee flexion angle and engage his gluts to limit functional knee valgus and stiffness with good carryover. Pt's favoring and deviations became slightly more prominent with an increase in fatigue, but overall pt is progressing well towards his goals and with beginning level impact work at this time.                                                                                                                                                                                                                                                                                                                                                                                                                                                                                                                                                                                                                                                                                                                                                                                                                                                                                                                                                                                                                                                                                                                                                                                                                            Plan:   Pt would continue to benefit from LE ROM, strengthening, stretching, stability, mobility, balance, core engagement, and functional training to continue to decrease his overall pain and increase his function.    Progress with POC, as tolerated.    Monitor home program.     Current Problem  1. Disruption of anterior cruciate ligament of knee, right, subsequent encounter        2. Right knee pain            General  PT  Visit  PT Received On: 01/31/25  Response to Previous Treatment: Patient with no complaints from previous session., Compliant with home exercise program  General  General Comment: Pt reports to PT today stating his knee was a little sore after his last session but overall he doesn't have noted pain. He reports the jumping seems to be making the joint feel a little better with less stiffness as a result.    Pt is currently 23 weeks and 3 days s/p R ACL BTB patellar tendon autograft  with meniscus repair on 8/20/24.     Subjective    Precautions  Precautions  STEADI Fall Risk Score (The score of 4 or more indicates an increased risk of falling): 0  Precautions Comment: ACLR with BTB autograft    Pain  Pain Assessment  Pain Assessment: 0-10  0-10 (Numeric) Pain Score: 0 - No pain    Objective   Pt demos a noted increase in mm tension around the patella with scar restriction at the patellar tendon    Treatments:  Therapeutic Exercise  Therapeutic Exercise Performed: Yes  Therapeutic Exercise Activity 1: Bike intervals 10 on/10 off x 5 mins  Therapeutic Exercise Activity 2: Dynamic warm-up: knees to chest, butt kicks, open/close gate, side lunge, field goal, tin soilder, heel  swipes  Therapeutic Exercise Activity 3: Resisted side step (black band) x 20 yrds R/L  Therapeutic Exercise Activity 4: Resisted zig zag step (black band) x 20 yrds ea F/B  Therapeutic Exercise Activity 5: Resisted monster walks (black band) x20 yds fwd/bwd  Therapeutic Exercise Activity 6: Side shuffle, carioca, quick high knees, quick butt kicks, skipping, lateral bounding x 20 yrds ea   Therapeutic Exercise Activity 7: Broad jumps 2 x 20 yrds   Therapeutic Exercise Activity 8: SL jump up to 4 inch box x 6 ea R/L   Therapeutic Exercise Activity 9: SL jump up lat to 4 inch box x 6 ea R?L   Therapeutic Exercise Activity 10: SL landing jump from 4 inch step fwd x 6 ea R/L   Therapeutic Exercise Activity 11: SL landing jump lat from 4 inch step x 6 ea R/L   Therapeutic Exercise Activity 12: SL jump up to lat landing x 6 ea R/L     Manual Therapy  Manual Therapy Performed: Yes  Manual Therapy Activity 1: IASTM and TP release to distal quad, ITband, and adductor in sitting  Manual Therapy Activity 2: Patellar tendon cross friction in sitting    OP EDUCATION:  Outpatient Education  Individual(s) Educated: Patient  Education Provided: Anatomy, Body Mechanics, Home Exercise Program, POC, Post-Op Precautions  Patient/Caregiver Demonstrated Understanding: yes  Plan of Care Discussed and Agreed Upon: yes  Patient Response to Education: Patient/Caregiver Verbalized Understanding of Information, Patient/Caregiver Performed Return Demonstration of Exercises/Activities, Patient/Caregiver Asked Appropriate Questions

## 2025-02-07 ENCOUNTER — TREATMENT (OUTPATIENT)
Dept: PHYSICAL THERAPY | Facility: CLINIC | Age: 45
End: 2025-02-07
Payer: COMMERCIAL

## 2025-02-07 DIAGNOSIS — S83.411A TEAR OF MCL (MEDIAL COLLATERAL LIGAMENT) OF KNEE, RIGHT, INITIAL ENCOUNTER: Primary | ICD-10-CM

## 2025-02-07 DIAGNOSIS — M25.561 CHRONIC PAIN OF RIGHT KNEE: ICD-10-CM

## 2025-02-07 DIAGNOSIS — G89.29 CHRONIC PAIN OF RIGHT KNEE: ICD-10-CM

## 2025-02-07 DIAGNOSIS — S83.511D DISRUPTION OF ANTERIOR CRUCIATE LIGAMENT OF KNEE, RIGHT, SUBSEQUENT ENCOUNTER: ICD-10-CM

## 2025-02-07 DIAGNOSIS — M25.561 RIGHT KNEE PAIN: ICD-10-CM

## 2025-02-07 PROCEDURE — 97110 THERAPEUTIC EXERCISES: CPT | Mod: GP | Performed by: PHYSICAL THERAPIST

## 2025-02-07 PROCEDURE — 97140 MANUAL THERAPY 1/> REGIONS: CPT | Mod: GP | Performed by: PHYSICAL THERAPIST

## 2025-02-07 ASSESSMENT — PAIN - FUNCTIONAL ASSESSMENT: PAIN_FUNCTIONAL_ASSESSMENT: 0-10

## 2025-02-07 ASSESSMENT — PAIN SCALES - GENERAL: PAINLEVEL_OUTOF10: 0 - NO PAIN

## 2025-02-07 NOTE — PROGRESS NOTES
Physical Therapy    Physical Therapy Treatment    Patient Name: Lorenzo Zhu  MRN: [id number]  Today's Date: 2/7/2025    Time Entry:   Time Calculation  Start Time: 0956  Stop Time: 1102  Time Calculation (min): 66 min      Insurance:   Visit Limit: 21/40  Date Range: No auth  Co-Pay: $50    Assessment:   Pt tolerated tx session well as detailed below. Tx emphasized impact training and SL strength. He presented with mild soreness along his incision site, which responded favorably to IASTM and scar tissue mobs. Impact training circuit at 60% was completed with patient demonstrating good motor control with all jumps, but demonstrating moderate levels of fatigue at the end of the second round. He was able to complete rounds three and four, but jump height was reduced and longer rest breaks were required. Pt completed leg extension and hamstring curl pyramids requiring verbal cueing to move through full range of motion in controlled manner, demonstrating significant fatigue requiring reduced weight at top of pyramid. Pt continues to be appropriate for skilled PT to address the deficits remaining below and continue progressing towards functional goals. PT to focus on strength training, hypertrophy, plyometrics/impact training, with manual therapy and modalities as needed.     Plan:   Pt would continue to benefit from LE ROM, strengthening, stretching, stability, mobility, balance, core engagement, and functional training to continue to decrease his overall pain and increase his function.    Progress with POC, as tolerated.    Monitor home program.     Current Problem  1. Tear of MCL (medial collateral ligament) of knee, right, initial encounter        2. Right knee pain        3. Disruption of anterior cruciate ligament of knee, right, subsequent encounter            General  PT  Visit  PT Received On: 02/07/25  Response to Previous Treatment: Patient with no complaints from previous session., Compliant with home  exercise program  General  General Comment: Pt reports to PT stating he tolerated last tx with jumping well with no increases in sx or joint pain.    Pt is currently 24 weeks and 3 days s/p R ACL BTB patellar tendon autograft  with meniscus repair on 8/20/24.     Subjective    Precautions  Precautions  STEADI Fall Risk Score (The score of 4 or more indicates an increased risk of falling): 0  Precautions Comment: ACLR with BTB autograft    Pain  Pain Assessment  Pain Assessment: 0-10  0-10 (Numeric) Pain Score: 0 - No pain    Objective   Pt demos significant fatigue with plyometric and impact training circuits.     Treatments:  Therapeutic Exercise  Therapeutic Exercise Performed: Yes  Therapeutic Exercise Activity 1: Bike x 8 mins  Therapeutic Exercise Activity 2: Dynamic warm-up: knees to chest, butt kicks, open/close gate, side lunge, field goal, tin soilder, heel swipes  Therapeutic Exercise Activity 3: Resisted side step (black band) x 20 yrds R/L  Therapeutic Exercise Activity 4: Resisted zig zag step (black band) x 20 yrds ea F/B  Therapeutic Exercise Activity 5: Resisted monster walks (black band) x20 yds fwd/bwd  Therapeutic Exercise Activity 6: Side shuffle, carioca, quick high knees, quick butt kicks, skipping, lateral bounding x 20 yrds ea  Therapeutic Exercise Activity 7: TG DL jumps 4x1 min (blue and yellow cables)  Therapeutic Exercise Activity 8: TG SL alternating jumps 4x1 min (yellow cable)  Therapeutic Exercise Activity 9: TG SL jumps 4x5 R/L (yellow cable)  Therapeutic Exercise Activity 10: burpees 4x10  Therapeutic Exercise Activity 11: SL knee ext pyramid from 100-90# (R)  Therapeutic Exercise Activity 12: SL hamstring curl pyramid from 60-40# (R)    Manual Therapy  Manual Therapy Performed: Yes  Manual Therapy Activity 1: IASTM and TP release to distal quad, ITband, and adductor in sitting  Manual Therapy Activity 2: Patellar tendon cross friction in sitting    OP EDUCATION:  Outpatient  Education  Individual(s) Educated: Patient  Education Provided: Anatomy, Body Mechanics, Home Exercise Program, POC, Post-Op Precautions  Patient/Caregiver Demonstrated Understanding: yes  Plan of Care Discussed and Agreed Upon: yes  Patient Response to Education: Patient/Caregiver Verbalized Understanding of Information, Patient/Caregiver Performed Return Demonstration of Exercises/Activities, Patient/Caregiver Asked Appropriate Questions

## 2025-02-13 ENCOUNTER — APPOINTMENT (OUTPATIENT)
Dept: PHYSICAL THERAPY | Facility: CLINIC | Age: 45
End: 2025-02-13
Payer: COMMERCIAL

## 2025-02-13 DIAGNOSIS — M25.561 RIGHT KNEE PAIN: ICD-10-CM

## 2025-03-07 ENCOUNTER — APPOINTMENT (OUTPATIENT)
Dept: PHYSICAL THERAPY | Facility: CLINIC | Age: 45
End: 2025-03-07
Payer: COMMERCIAL

## 2025-03-07 DIAGNOSIS — M25.561 RIGHT KNEE PAIN: ICD-10-CM

## 2025-03-14 ENCOUNTER — APPOINTMENT (OUTPATIENT)
Dept: PHYSICAL THERAPY | Facility: CLINIC | Age: 45
End: 2025-03-14
Payer: COMMERCIAL

## 2025-03-14 DIAGNOSIS — M25.561 RIGHT KNEE PAIN: ICD-10-CM

## 2025-03-17 ENCOUNTER — APPOINTMENT (OUTPATIENT)
Dept: PHYSICAL THERAPY | Facility: CLINIC | Age: 45
End: 2025-03-17
Payer: COMMERCIAL

## 2025-03-17 DIAGNOSIS — S83.511D DISRUPTION OF ANTERIOR CRUCIATE LIGAMENT OF KNEE, RIGHT, SUBSEQUENT ENCOUNTER: Primary | ICD-10-CM

## 2025-03-17 DIAGNOSIS — G89.29 CHRONIC PAIN OF RIGHT KNEE: ICD-10-CM

## 2025-03-17 DIAGNOSIS — M25.561 CHRONIC PAIN OF RIGHT KNEE: ICD-10-CM

## 2025-03-17 DIAGNOSIS — S83.411A TEAR OF MCL (MEDIAL COLLATERAL LIGAMENT) OF KNEE, RIGHT, INITIAL ENCOUNTER: ICD-10-CM

## 2025-03-17 DIAGNOSIS — M25.561 RIGHT KNEE PAIN: ICD-10-CM

## 2025-03-17 PROCEDURE — 97110 THERAPEUTIC EXERCISES: CPT | Mod: GP | Performed by: PHYSICAL THERAPIST

## 2025-03-17 PROCEDURE — 97140 MANUAL THERAPY 1/> REGIONS: CPT | Mod: GP | Performed by: PHYSICAL THERAPIST

## 2025-03-17 ASSESSMENT — PAIN SCALES - GENERAL: PAINLEVEL_OUTOF10: 0 - NO PAIN

## 2025-03-17 ASSESSMENT — PAIN - FUNCTIONAL ASSESSMENT: PAIN_FUNCTIONAL_ASSESSMENT: 0-10

## 2025-03-17 NOTE — PROGRESS NOTES
Physical Therapy    Physical Therapy Treatment    Patient Name: Lorenzo Zhu  MRN: [id number]  Today's Date: 3/17/2025    Time Entry:   Time Calculation  Start Time: 0956  Stop Time: 1106  Time Calculation (min): 70 min         Insurance:   Visit Limit: 22/40  Date Range: No auth  Co-Pay: $50    Assessment:   Pt tolerated tx session well as detailed below. Tx emphasized SL impact and ROM. Pt presented with limited knee extension ROM and significant stiffness and tension in the distal hamstring, which responded favorably to IASTM and prone knee hang. Pt continues to progress SL impact training, demonstrating improved form and mechanics on the RLE >  LLE, but fatiguing quicker on the RLE. He continues to require occasional cueing for glute activation to avoid knee valgus upon landing. Pt was educated about importance of keeping up with strength training independently as we continue to progress plyometrics within sessions, providing verbal understanding. He continues to be appropriate for skilled PT to address the deficits remaining below and continue progressing towards functional goals and return to hockey and crossfit.     Plan:    Pt would continue to benefit from LE ROM, strengthening, stretching, stability, mobility, balance, core engagement, and functional training to continue to decrease his overall pain and increase his function.    Progress with POC, as tolerated.    Monitor home program.     Current Problem  1. Disruption of anterior cruciate ligament of knee, right, subsequent encounter        2. Right knee pain        3. Tear of MCL (medial collateral ligament) of knee, right, initial encounter            General  PT  Visit  PT Received On: 03/17/25  Response to Previous Treatment: Patient with no complaints from previous session., Compliant with home exercise program  General  General Comment: Pt reports to PT stating he has done okay since last session. Pt has been extrmely busy with personal travel  and has had limited time to workout noting increased stiffness globally around the RLE as a result, but no joint pain.    Pt is currently 29 weeks and 6 days s/p R ACL BTB patellar tendon autograft  with meniscus repair on 8/20/24.    Subjective    Precautions  Precautions  STEADI Fall Risk Score (The score of 4 or more indicates an increased risk of falling): 0  Precautions Comment: ACLR with BTB autograft    Pain  Pain Assessment  Pain Assessment: 0-10  0-10 (Numeric) Pain Score: 0 - No pain    Objective   Pt continues to demo stiffness and tension in the distal hamstring and proximal gastroc, limiting knee extension on RLE.     Treatments:  Therapeutic Exercise  Therapeutic Exercise Performed: Yes  Therapeutic Exercise Activity 1: Bike x 8 mins  Therapeutic Exercise Activity 2: Dynamic warm-up: knees to chest, butt kicks, open/close gate, side lunge, field goal, tin soilder, heel swipes  Therapeutic Exercise Activity 3: Resisted side step (black band) x 20 yrds R/L  Therapeutic Exercise Activity 4: Resisted zig zag step (black band) x 20 yrds ea F/B  Therapeutic Exercise Activity 5: Resisted monster walks (black band) x20 yds fwd/bwd  Therapeutic Exercise Activity 6: Quick turnover: Side shuffle, carioca, quick high knees, quick butt kicks, skipping, lateral bounding x 20 yrds ea  Therapeutic Exercise Activity 7: Skater slide board 3x30 sec R/L  Therapeutic Exercise Activity 8: TRX assisted retro lunges with knee drive 3x12 R/L  Therapeutic Exercise Activity 9: SL drop jump from 12 inch box 3x8 R/L  Therapeutic Exercise Activity 10: SL lat on/fwd off 6 inch box 3x6 R/L  Therapeutic Exercise Activity 11: SL fwd on/lat off 6 inch box 3x6 R/L    Manual Therapy  Manual Therapy Performed: Yes  Manual Therapy Activity 1: IASTM and TP release to distal quad, ITband, and adductor in sitting  Manual Therapy Activity 2: Patellar tendon cross friction in sitting  Manual Therapy Activity 3: IASTM and TP release to distal  hamstring and proximal quad with prone knee hang at 10#    OP EDUCATION:  Outpatient Education  Individual(s) Educated: Patient  Education Provided: Anatomy, Body Mechanics, Home Exercise Program, POC, Post-Op Precautions  Patient/Caregiver Demonstrated Understanding: yes  Plan of Care Discussed and Agreed Upon: yes  Patient Response to Education: Patient/Caregiver Verbalized Understanding of Information, Patient/Caregiver Performed Return Demonstration of Exercises/Activities, Patient/Caregiver Asked Appropriate Questions

## 2025-03-26 ENCOUNTER — APPOINTMENT (OUTPATIENT)
Dept: PHYSICAL THERAPY | Facility: CLINIC | Age: 45
End: 2025-03-26
Payer: COMMERCIAL

## 2025-03-26 DIAGNOSIS — M25.561 RIGHT KNEE PAIN: ICD-10-CM

## 2025-03-26 DIAGNOSIS — G89.29 CHRONIC PAIN OF RIGHT KNEE: ICD-10-CM

## 2025-03-26 DIAGNOSIS — M25.561 CHRONIC PAIN OF RIGHT KNEE: ICD-10-CM

## 2025-03-26 DIAGNOSIS — S83.511D DISRUPTION OF ANTERIOR CRUCIATE LIGAMENT OF KNEE, RIGHT, SUBSEQUENT ENCOUNTER: Primary | ICD-10-CM

## 2025-03-26 PROCEDURE — 97140 MANUAL THERAPY 1/> REGIONS: CPT | Mod: GP | Performed by: PHYSICAL THERAPIST

## 2025-03-26 PROCEDURE — 97110 THERAPEUTIC EXERCISES: CPT | Mod: GP | Performed by: PHYSICAL THERAPIST

## 2025-03-26 ASSESSMENT — PAIN - FUNCTIONAL ASSESSMENT: PAIN_FUNCTIONAL_ASSESSMENT: 0-10

## 2025-03-26 ASSESSMENT — PAIN SCALES - GENERAL: PAINLEVEL_OUTOF10: 0 - NO PAIN

## 2025-03-26 NOTE — PROGRESS NOTES
Physical Therapy    Physical Therapy Treatment    Patient Name: Lorenzo Zhu  MRN: [id number]  Today's Date: 3/26/2025    Time Entry:   Time Calculation  Start Time: 0101  Stop Time: 0141  Time Calculation (min): 40 min          Insurance:   Visit Limit: 23/40  Date Range: No auth  Co-Pay: $50    Assessment:   Pt had a fair tolerance to tx session as detailed below. Tx emphasized manual therapy and SL strengthening due to pt recovering from illness. He presented with mild tension and stiffness in the patellar tendon and distal quad which continues to respond favorably to IASTM and cross friction massage. Pt was only able to complete 3 rounds of a small strength circuit due to not feeling well and requesting to end the session early. He was able to complete SL eccentric hamstring curls and knee extensions while good motor control, but he demonstrated mild difficulty with rearfoot elevated split squats requiring cues to maintain upright chest alignment. Pt continues to be appropriate for skilled PT to address the deficits remaining below and continue progressing towards functional goals.     Plan:   Pt would continue to benefit from LE ROM, strengthening, stretching, stability, mobility, balance, core engagement, and functional training to continue to decrease his overall pain and increase his function.    Progress with POC, as tolerated.    Monitor home program.     Current Problem  1. Disruption of anterior cruciate ligament of knee, right, subsequent encounter        2. Right knee pain        3. Chronic pain of right knee [M25.561, G89.29]            General  PT  Visit  PT Received On: 03/26/25  Response to Previous Treatment: Patient with no complaints from previous session., Compliant with home exercise program  General  General Comment: Pt reports to PT stating he tolerated last session well but has since been sick and recovering so has not done much strength training outside of PT.    Pt is currently 31 weeks  and 1 days s/p R ACL BTB patellar tendon autograft  with meniscus repair on 8/20/24.    Subjective    Precautions  Precautions  STEADI Fall Risk Score (The score of 4 or more indicates an increased risk of falling): 0  Precautions Comment: ACLR with BTB autograft    Pain  Pain Assessment  Pain Assessment: 0-10  0-10 (Numeric) Pain Score: 0 - No pain    Objective   Pt demonstrated reduced tolerance to activity due to illness, but was able to complete small strength circuit with good form and motor control.     Treatments:  Therapeutic Exercise  Therapeutic Exercise Performed: Yes  Therapeutic Exercise Activity 1: Bike x 8 mins  Therapeutic Exercise Activity 2: Dynamic warm-up: knees to chest, butt kicks, open/close gate, side lunge, field goal, tin soilder, heel swipes  Therapeutic Exercise Activity 3: Resisted side step (black band) x 20 yrds R/L  Therapeutic Exercise Activity 4: Resisted zig zag step (black band) x 20 yrds ea F/B  Therapeutic Exercise Activity 5: Resisted monster walks (black band) x20 yds fwd/bwd  Therapeutic Exercise Activity 6: SL knee ext ecc 3x6 R/L at 70#  Therapeutic Exercise Activity 7: SL hamstring curl ecc at 40# 3x6 R/L  Therapeutic Exercise Activity 8: sumo squat with pulse at 55# 3x10  Therapeutic Exercise Activity 9: rearfoot elevated split squats at 35# 3x6 R/L    Manual Therapy  Manual Therapy Performed: Yes  Manual Therapy Activity 1: IASTM and TP release to distal quad, ITband, and adductor in sitting  Manual Therapy Activity 2: Patellar tendon cross friction in sitting    OP EDUCATION:  Outpatient Education  Individual(s) Educated: Patient  Education Provided: Anatomy, Body Mechanics, Home Exercise Program, POC, Post-Op Precautions  Patient/Caregiver Demonstrated Understanding: yes  Plan of Care Discussed and Agreed Upon: yes  Patient Response to Education: Patient/Caregiver Verbalized Understanding of Information, Patient/Caregiver Performed Return Demonstration of  Exercises/Activities, Patient/Caregiver Asked Appropriate Questions

## 2025-04-02 ENCOUNTER — APPOINTMENT (OUTPATIENT)
Dept: PHYSICAL THERAPY | Facility: CLINIC | Age: 45
End: 2025-04-02
Payer: COMMERCIAL

## 2025-04-02 DIAGNOSIS — G89.29 CHRONIC PAIN OF RIGHT KNEE: ICD-10-CM

## 2025-04-02 DIAGNOSIS — M25.561 RIGHT KNEE PAIN: ICD-10-CM

## 2025-04-02 DIAGNOSIS — M25.561 CHRONIC PAIN OF RIGHT KNEE: ICD-10-CM

## 2025-04-02 DIAGNOSIS — S83.511D DISRUPTION OF ANTERIOR CRUCIATE LIGAMENT OF KNEE, RIGHT, SUBSEQUENT ENCOUNTER: Primary | ICD-10-CM

## 2025-04-02 PROCEDURE — 97110 THERAPEUTIC EXERCISES: CPT | Mod: GP | Performed by: PHYSICAL THERAPIST

## 2025-04-02 ASSESSMENT — PAIN SCALES - GENERAL: PAINLEVEL_OUTOF10: 0 - NO PAIN

## 2025-04-02 ASSESSMENT — PAIN - FUNCTIONAL ASSESSMENT: PAIN_FUNCTIONAL_ASSESSMENT: 0-10

## 2025-04-02 NOTE — PROGRESS NOTES
Physical Therapy    Physical Therapy Treatment    Patient Name: Lorenzo Zhu  MRN: [id number]  Today's Date: 4/2/2025    Time Entry:   Time Calculation  Start Time: 0902  Stop Time: 1001  Time Calculation (min): 59 min    Insurance:   Visit Limit: 24/40  Date Range: No auth  Co-Pay: $50    Assessment:   Patient presented to PT today stating he did not have any significant tightness or restriction and therefore did not request manual work prior to starting TherEX.  He was able to progress into impact exercises again today and worked his way up from soft impact to higher level single-leg jumping without a significant increase in irritation.  Patient reported noted fatigue after several ladder drills, but was mostly in the calf versus knee.  He was able to tolerate light change in direction work on ladders without an increase in irritation and limited hesitation on operative versus nonoperative knee.  Patient continues to need intermittent cueing to increase his speed to work on turnover but with good carryover afterwards.  He reported significant fatigue after single-leg jumping with blaze pods but was able to perform all reps without increase in irritation.  Patient continues to progress well towards goals for therapy at this time.    Plan:   Pt would continue to benefit from LE ROM, strengthening, stretching, stability, mobility, balance, core engagement, and functional training to continue to decrease his overall pain and increase his function.    Progress with POC, as tolerated.    Monitor home program.     Current Problem  1. Disruption of anterior cruciate ligament of knee, right, subsequent encounter        2. Right knee pain        3. Chronic pain of right knee [M25.561, G89.29]            General  PT  Visit  PT Received On: 04/02/25  Response to Previous Treatment: Patient with no complaints from previous session., Compliant with home exercise program  General  General Comment: Pt reports to PT today  stating his knee continues to feel progressively better and he hasn't had an increase in pain or irritation lately. He reports he continues to have tightness with ROM but overall feels like he is making progress. He reports he has been able to do a few exercise sessions but still isn't feeling his best.    Pt is currently 32 weeks and 1 days s/p R ACL BTB patellar tendon autograft  with meniscus repair on 8/20/24.    Subjective    Precautions  Precautions  STEADI Fall Risk Score (The score of 4 or more indicates an increased risk of falling): 0  Precautions Comment: ACLR with BTB autograft    Pain  Pain Assessment  Pain Assessment: 0-10  0-10 (Numeric) Pain Score: 0 - No pain    Objective   Pt continues to demo an increase in functional mobility but needs intermittent cueing for proper landing mechanics    Treatments:  Therapeutic Exercise  Therapeutic Exercise Performed: Yes  Therapeutic Exercise Activity 1: Bike x 8 mins  Therapeutic Exercise Activity 2: Dynamic warm-up: knees to chest, butt kicks, open/close gate, side lunge, field goal, tin soilder, heel swipes  Therapeutic Exercise Activity 3: Resisted side step (black band) x 20 yrds R/L  Therapeutic Exercise Activity 4: Resisted zig zag step (black band) x 20 yrds ea F/B  Therapeutic Exercise Activity 5: Resisted monster walks (black band) x20 yds fwd/bwd  Therapeutic Exercise Activity 6: Side shuffle, carioca, quick turnover high knees and butt kicks, skipping, and lateral bounding x 30 yrds ea  Therapeutic Exercise Activity 7: Ladder drills with quick turnover, cutting, and jumping x 15 mins  Therapeutic Exercise Activity 8: Blazepod reactive SL hops 2 x 45 sec  Therapeutic Exercise Activity 9: Blazepod reactive SL double jumps 2 x 45 sec    OP EDUCATION:  Outpatient Education  Individual(s) Educated: Patient  Education Provided: Anatomy, Body Mechanics, Home Exercise Program, POC, Post-Op Precautions  Patient/Caregiver Demonstrated Understanding: yes  Plan  of Care Discussed and Agreed Upon: yes  Patient Response to Education: Patient/Caregiver Verbalized Understanding of Information, Patient/Caregiver Performed Return Demonstration of Exercises/Activities, Patient/Caregiver Asked Appropriate Questions

## 2025-04-09 ENCOUNTER — APPOINTMENT (OUTPATIENT)
Dept: PHYSICAL THERAPY | Facility: CLINIC | Age: 45
End: 2025-04-09
Payer: COMMERCIAL

## 2025-04-09 DIAGNOSIS — M25.561 CHRONIC PAIN OF RIGHT KNEE: ICD-10-CM

## 2025-04-09 DIAGNOSIS — M25.561 RIGHT KNEE PAIN: ICD-10-CM

## 2025-04-09 DIAGNOSIS — S83.511D DISRUPTION OF ANTERIOR CRUCIATE LIGAMENT OF KNEE, RIGHT, SUBSEQUENT ENCOUNTER: Primary | ICD-10-CM

## 2025-04-09 DIAGNOSIS — G89.29 CHRONIC PAIN OF RIGHT KNEE: ICD-10-CM

## 2025-04-09 PROCEDURE — 97110 THERAPEUTIC EXERCISES: CPT | Mod: GP | Performed by: PHYSICAL THERAPIST

## 2025-04-09 PROCEDURE — 97140 MANUAL THERAPY 1/> REGIONS: CPT | Mod: GP | Performed by: PHYSICAL THERAPIST

## 2025-04-09 ASSESSMENT — PAIN - FUNCTIONAL ASSESSMENT: PAIN_FUNCTIONAL_ASSESSMENT: 0-10

## 2025-04-09 ASSESSMENT — PAIN SCALES - GENERAL: PAINLEVEL_OUTOF10: 0 - NO PAIN

## 2025-04-09 NOTE — PROGRESS NOTES
Physical Therapy    Physical Therapy Treatment    Patient Name: Lorenzo Zhu  MRN: [id number]  Today's Date: 4/9/2025    Time Entry:   Time Calculation  Start Time: 0803  Stop Time: 0902  Time Calculation (min): 59 min    Insurance:   Visit Limit: 25/40  Date Range: No auth  Co-Pay: $50    Assessment:   Patient reports to PT today with an increase in tightness and restriction globally around the distal quad and anterior knee, but responds well to manual work.  He reported a significant increase in mobility along the patellar tendon after IASTM and cross friction and was able to progress into resistance and dynamic activities without increase in irritation.  Patient was able to perform change in direction drills for the first time today needing intermittent cueing for proper form and performance but with improved carryover.  He consistently rounded out his corners more than taking more sharp and quick turns, but reported he did not have any increase in irritation, just hesitation.  Patient reported noted fatigue after several rounds of change in direction exercises but was able to complete all drills he was given today.  He reported a slight increase in knee soreness after change in direction activities and was educated to note any change in swelling or pain as a result of activity with patient stating understanding.  Patient continues to progress well towards his goals for physical therapy at this time.    Plan:   Pt would continue to benefit from LE ROM, strengthening, stretching, stability, mobility, balance, core engagement, and functional training to continue to decrease his overall pain and increase his function.    Progress with POC, as tolerated.    Monitor home program.     Current Problem  1. Disruption of anterior cruciate ligament of knee, right, subsequent encounter        2. Right knee pain        3. Chronic pain of right knee [M25.561, G89.29]            General  PT  Visit  PT Received On:  04/09/25  Response to Previous Treatment: Patient with no complaints from previous session., Compliant with home exercise program  General  General Comment: Pt reports to PT today stating his knee was a little sore and tight after his last session with an increase in impact, but overall he doesn't have noted pain or irritation.    Pt is currently 33 weeks and 1 days s/p R ACL BTB patellar tendon autograft  with meniscus repair on 8/20/24.    Subjective    Precautions  Precautions  STEADI Fall Risk Score (The score of 4 or more indicates an increased risk of falling): 0  Precautions Comment: ACLR with BTB autograft    Pain  Pain Assessment  Pain Assessment: 0-10  0-10 (Numeric) Pain Score: 0 - No pain    Objective   Pt continues to demo noted fatigue after several reps of cone drills but is making steady progress    Treatments:  Therapeutic Exercise  Therapeutic Exercise Performed: Yes  Therapeutic Exercise Activity 1: Bike x 8 mins  Therapeutic Exercise Activity 2: Dynamic warm-up: knees to chest, butt kicks, open/close gate, side lunge, field goal, tin soilder, heel swipes  Therapeutic Exercise Activity 3: Resisted side step (black band) x 20 yrds R/L  Therapeutic Exercise Activity 4: Resisted zig zag step (black band) x 20 yrds ea F/B  Therapeutic Exercise Activity 5: Resisted monster walks (black band) x20 yds fwd/bwd  Therapeutic Exercise Activity 6: Side shuffle, carioca, quick turnover high knees and butt kicks, skipping, and lateral bounding x 30 yrds ea  Therapeutic Exercise Activity 7: Cone drills fwd, back, side shuffle x 6 rds  Therapeutic Exercise Activity 8: Cone drills for x drills x 4 rds  Therapeutic Exercise Activity 9: Cone drills for M drills x 4 rds  Therapeutic Exercise Activity 10: Cone drills for multiple change in direction drills x 4    Manual Therapy  Manual Therapy Performed: Yes  Manual Therapy Activity 1: IASTM and TP release to distal quad, ITband, and adductor in sitting  Manual  Therapy Activity 2: Patellar tendon cross friction in sitting    OP EDUCATION:  Outpatient Education  Individual(s) Educated: Patient  Education Provided: Anatomy, Body Mechanics, Home Exercise Program, POC, Post-Op Precautions  Patient/Caregiver Demonstrated Understanding: yes  Plan of Care Discussed and Agreed Upon: yes  Patient Response to Education: Patient/Caregiver Verbalized Understanding of Information, Patient/Caregiver Performed Return Demonstration of Exercises/Activities, Patient/Caregiver Asked Appropriate Questions

## 2025-05-14 ENCOUNTER — OFFICE VISIT (OUTPATIENT)
Dept: ORTHOPEDIC SURGERY | Facility: CLINIC | Age: 45
End: 2025-05-14
Payer: COMMERCIAL

## 2025-05-14 DIAGNOSIS — S83.511D DISRUPTION OF ANTERIOR CRUCIATE LIGAMENT OF KNEE, RIGHT, SUBSEQUENT ENCOUNTER: Primary | ICD-10-CM

## 2025-05-14 PROCEDURE — 1036F TOBACCO NON-USER: CPT | Performed by: PHYSICIAN ASSISTANT

## 2025-05-14 PROCEDURE — 99212 OFFICE O/P EST SF 10 MIN: CPT | Performed by: PHYSICIAN ASSISTANT

## 2025-05-14 PROCEDURE — 99213 OFFICE O/P EST LOW 20 MIN: CPT | Performed by: PHYSICIAN ASSISTANT

## 2025-05-14 ASSESSMENT — PAIN - FUNCTIONAL ASSESSMENT: PAIN_FUNCTIONAL_ASSESSMENT: NO/DENIES PAIN

## 2025-05-14 NOTE — PROGRESS NOTES
Subjective    Patient ID: Lorenzo Zhu is a 44 y.o. male.    Procedure: Right knee ACL reconstruction with patella tendon autograft  Date of surgery: 8/20/2024      HPI:  Lorenzo Zhu is a 44 y.o. male who is almost 9 months out from right knee ACL reconstruction with patella tendon autograft.  Overall he is doing well.  He feels that he is making progress.  He has starting doing some change of direction in physical therapy.  He notes only minimal discomfort that occurs after activity.  It usually resolves quickly.  Medications are not required.  He denies any feelings of instability.    ROS  Constitutional: No fever, no chills, not feeling tired, no recent weight gain and no recent weight loss  ENT: No nosebleeds  Cardiovascular: No chest pain  Respiratory: No shortness of breath and no cough  Gastrointestinal: No abdominal pain, no nausea, no diarrhea, and no vomiting  Musculoskeletal: No arthralgias  Integumentary: No rashes and no skin lesions  Neurological: No headache  Psychiatric: No sleep disturbances no depression  Endocrine: No muscle weakness and no muscle cramps  Hematologic/lymphatic: No swelling glands and no tendency for easy bruising    Medical History[1]     Surgical History[2]     Current Medications[3]     RX Allergies[4]     Social Connections: Not on file            Objective   44-year-old male well appearing in no acute distress. Alert and oriented ×3.  Skin intact bilateral lower extremities.   Normal tandem gait. Coordination and balance intact.  Bilateral lower extremity compartments supple.  5 out of 5 distal motor strength bilaterally.  L4 through S1 sensation intact bilaterally.  2+ DP/PT pulses bilaterally.  Right knee incisions are well-healed with minimal scarring.  No joint effusion.  Improved quad tone.  Active range of motion 0 to 135 degrees, symmetric to the left knee.  No tenderness along the patellar tendon.  Negative Lachman's.      Assessment/Plan   Encounter  Diagnoses:  Disruption of anterior cruciate ligament of knee, right, subsequent encounter    No orders of the defined types were placed in this encounter.      Overall he is doing well.  I recommended that he continue with physical therapy and progress back into a functional program.  He is interested in returning to ice hockey.  He is encouraged to use the rest of the summer to readapt to skating and playing hockey.  By the time league starts in August he can resume back to full play.  We discussed that there is an inherent risk of recurrent injury as he progresses back to sports.  He should continue with his home exercise program as well as perform these for the contralateral knee as these will act as an ACL prevention program.  Continue frequent icing and use over-the-counter medicines for any soreness.  All of his questions were answered today.  We will see him back as needed    This office note was dictated using Dragon voice to text software and was not proofread for spelling or grammatical errors        [1]   Past Medical History:  Diagnosis Date    Encounter for other general counseling and advice on contraception 02/19/2019    Vasectomy evaluation    Other specified health status     No pertinent past medical history    Personal history of other specified conditions 09/21/2017    History of nausea   [2]   Past Surgical History:  Procedure Laterality Date    ANTERIOR CRUCIATE LIGAMENT REPAIR Right 08/20/2024    Right knee ACL reconstruction with patella tendon autograft    APPENDECTOMY      OTHER SURGICAL HISTORY  02/19/2019    Cyst excision    VASECTOMY  2021   [3]   Current Outpatient Medications:     ketoconazole (NIZOral) 2 % cream, Apply sparingly to affected area twice daily (Patient not taking: Reported on 8/6/2024), Disp: 30 g, Rfl: 0    multivitamin tablet, Take 1 tablet by mouth once daily., Disp: , Rfl:     tirzepatide, weight loss, (Zepbound) 5 mg/0.5 mL injection, Inject 5 mg under the skin  every 7 days., Disp: 4 each, Rfl: 2  [4] No Known Allergies

## (undated) DEVICE — BANDAGE, ESMARK, 6 IN X 12 FT

## (undated) DEVICE — PENCIL, ELECTROSURG, W/BUTTON SWITCH & HOLSTER, EZ CLEAN, DISP

## (undated) DEVICE — DRAPE, SHEET, 17 X 23 IN

## (undated) DEVICE — Device

## (undated) DEVICE — DRESSING, GAUZE, PETROLATUM, XEROFORM, 4 X 4, PEELABLE FOIL

## (undated) DEVICE — GOWN, ECLIPSE, PREVENTION PLUS,  XXLARGE, XLONG

## (undated) DEVICE — TUBING, DUAL WAVE, OUTFLOW

## (undated) DEVICE — COVER, TABLE, 44X90

## (undated) DEVICE — GLOVES, SURG BIOGEL, SZ-8.5, PF, LF

## (undated) DEVICE — TUBING, SUCTION, 6MM X 10, CLEAN N-COND

## (undated) DEVICE — SUTURE, FIBERLOOP, P2, BLUE, STRAIGHT

## (undated) DEVICE — TIP, SUCTION, YANKAUER, FLEXIBLE

## (undated) DEVICE — GLOVE, SURGICAL, PROTEXIS PI , 7.0, PF, LF

## (undated) DEVICE — SUTURE, ULTRABRAID 2 BLUE

## (undated) DEVICE — BLADE, OSCILLATING/SAGITTAL, 25MM X 9MM

## (undated) DEVICE — STRIP, SKIN CLOSURE, STERI STRIP, REINFORCED, 0.5 X 4 IN

## (undated) DEVICE — SYRINGE, 60 CC, IRRIGATION, BULB, CONTRO-BULB, PAPER POUCH

## (undated) DEVICE — ENDOBUTTON, ACL CL PAC

## (undated) DEVICE — SUTURE, VICRYL, 0, 27 IN, CT-2, UNDYED

## (undated) DEVICE — NEEDLE, SPINAL, S/SU, 18GA 3IN, QUINCKE, STERILE

## (undated) DEVICE — CONTAINER STERILE SPECIMEN 90ML, STERILE

## (undated) DEVICE — DRESSING, ABDOMINAL, TENDERSORB, 8 X 7-1/2 IN, STERILE

## (undated) DEVICE — CUTTER, BONE, 5.5 X 13

## (undated) DEVICE — SUTURE, CTD, VICRYL, 2-0, UND, BR, CT-2

## (undated) DEVICE — GLOVE, SURGICAL, PROTEXIS PI BLUE W/NEUTHERA, 7.5, PF, LF

## (undated) DEVICE — GLOVE, SURGICAL, PROTEXIS PI , 8.0, PF, LF

## (undated) DEVICE — BLANKET, LOWER BODY, VHA PLUS, ADULT

## (undated) DEVICE — SOLUTION, INJECTION, SODIUM CHLORIDE 9%, 3000ML

## (undated) DEVICE — GOWN, ASTOUND, XL

## (undated) DEVICE — SHAVER, SABRETOOTH, CURVED, 4.0MM X 13CM

## (undated) DEVICE — SUTURE, FIBERWIRE 2, T-5 TAPER NEEDLE, 38"

## (undated) DEVICE — PADDING, UNDERCAST, WEBRIL, 6 IN X 4 YD, REG, NS

## (undated) DEVICE — BANDAGE, COFLEX, 6 X 5 YDS, TAN, STERILE, LF

## (undated) DEVICE — SPONGE, GAUZE, AVANT, STERILE, NONWOVEN, 4PLY, 4 X 4, STANDARD

## (undated) DEVICE — PIN, PASSING 2.4 FLEXIBLE

## (undated) DEVICE — SUTURE, ETHILON, 3-0, 18 IN, PS1, BLACK

## (undated) DEVICE — TUBING, PUMP MAIN 16FT STERILE

## (undated) DEVICE — SUTURE, MONOCRYL, 3-0, 27 IN, PS-2, UNDYED